# Patient Record
Sex: MALE | Race: WHITE | NOT HISPANIC OR LATINO | Employment: OTHER | URBAN - NONMETROPOLITAN AREA
[De-identification: names, ages, dates, MRNs, and addresses within clinical notes are randomized per-mention and may not be internally consistent; named-entity substitution may affect disease eponyms.]

---

## 2017-02-09 ENCOUNTER — OFFICE VISIT (OUTPATIENT)
Dept: MEDICAL GROUP | Facility: PHYSICIAN GROUP | Age: 82
End: 2017-02-09
Payer: MEDICARE

## 2017-02-09 VITALS
OXYGEN SATURATION: 96 % | BODY MASS INDEX: 20.33 KG/M2 | HEIGHT: 70 IN | RESPIRATION RATE: 16 BRPM | SYSTOLIC BLOOD PRESSURE: 138 MMHG | TEMPERATURE: 98.3 F | HEART RATE: 80 BPM | WEIGHT: 142 LBS | DIASTOLIC BLOOD PRESSURE: 78 MMHG

## 2017-02-09 DIAGNOSIS — M79.605 PAIN OF LEFT LOWER EXTREMITY: ICD-10-CM

## 2017-02-09 DIAGNOSIS — E78.5 DYSLIPIDEMIA: ICD-10-CM

## 2017-02-09 DIAGNOSIS — I73.9 CLAUDICATION (HCC): ICD-10-CM

## 2017-02-09 DIAGNOSIS — Z72.0 TOBACCO ABUSE: ICD-10-CM

## 2017-02-09 DIAGNOSIS — I10 ESSENTIAL HYPERTENSION: ICD-10-CM

## 2017-02-09 PROCEDURE — 4004F PT TOBACCO SCREEN RCVD TLK: CPT | Performed by: INTERNAL MEDICINE

## 2017-02-09 PROCEDURE — G8484 FLU IMMUNIZE NO ADMIN: HCPCS | Performed by: INTERNAL MEDICINE

## 2017-02-09 PROCEDURE — 4040F PNEUMOC VAC/ADMIN/RCVD: CPT | Mod: 8P | Performed by: INTERNAL MEDICINE

## 2017-02-09 PROCEDURE — 1101F PT FALLS ASSESS-DOCD LE1/YR: CPT | Performed by: INTERNAL MEDICINE

## 2017-02-09 PROCEDURE — G8419 CALC BMI OUT NRM PARAM NOF/U: HCPCS | Performed by: INTERNAL MEDICINE

## 2017-02-09 PROCEDURE — 99214 OFFICE O/P EST MOD 30 MIN: CPT | Performed by: INTERNAL MEDICINE

## 2017-02-09 PROCEDURE — G8432 DEP SCR NOT DOC, RNG: HCPCS | Performed by: INTERNAL MEDICINE

## 2017-02-09 RX ORDER — CILOSTAZOL 100 MG/1
100 TABLET ORAL 2 TIMES DAILY
Qty: 60 TAB | Refills: 3 | Status: SHIPPED | OUTPATIENT
Start: 2017-02-09 | End: 2017-06-09 | Stop reason: SDUPTHER

## 2017-02-09 NOTE — MR AVS SNAPSHOT
"        Elias Alberts   2017 3:00 PM   Office Visit   MRN: [id number]    Department:  [facility]   Dept Phone:  [phone]    Description:  Male : 1933   Provider:  Nikki Lopez M.D.           Reason for Visit     Leg Pain R Lower Leg pain x 1 month      Allergies as of 2017     No Known Allergies      You were diagnosed with     Pain of left lower extremity   [2551395]       Essential hypertension   [9833761]       Dyslipidemia   [310411]       Tobacco abuse   [991568]       Claudication (CMS-HCC)   [816781]       Elevated blood pressure   [845148]         Vital Signs     Blood Pressure Pulse Temperature Respirations Height Weight    138/78 mmHg 80 36.8 °C (98.3 °F) 16 1.778 m (5' 10\") 64.411 kg (142 lb)    Body Mass Index Oxygen Saturation Smoking Status             20.37 kg/m2 96% Current Every Day Smoker         Basic Information     Date Of Birth Sex Race Ethnicity Preferred Language    1933 Male White Non- English      Your appointments     Mar 09, 2017  1:20 PM   Established Patient with Nikki Lopez M.D.   [address] [address] [phone]           You will be receiving a confirmation call a few days before your appointment from our automated call confirmation system.              Problem List              ICD-10-CM Priority Class Noted - Resolved    Tobacco abuse Z72.0   Unknown - Present    HTN (hypertension) I10   Unknown - Present    Gastritis K29.70   2014 - Present    Advance care planning Z71.89   3/23/2016 - Present    Claudication (CMS-HCC) I73.9   2017 - Present    Elevated blood pressure I10   2017 - Present      Health Maintenance        Date Due Completion Dates    IMM DTaP/Tdap/Td Vaccine (1 - Tdap) 1952 ---    IMM ZOSTER VACCINE 1993 ---    IMM PNEUMOCOCCAL 65+ (ADULT) LOW/MEDIUM RISK SERIES (1 of 2 - PCV13) 1998 ---    IMM INFLUENZA (1) 2016 --- "            Current Immunizations     No immunizations on file.      Below and/or attached are the medications your provider expects you to take. Review all of your home medications and newly ordered medications with your provider and/or pharmacist. Follow medication instructions as directed by your provider and/or pharmacist. Please keep your medication list with you and share with your provider. Update the information when medications are discontinued, doses are changed, or new medications (including over-the-counter products) are added; and carry medication information at all times in the event of emergency situations     Allergies:  No Known Allergies          Medications  Valid as of: February 09, 2017 -  3:45 PM    Generic Name Brand Name Tablet Size Instructions for use    Cilostazol (Tab) PLETAL 100 MG Take 1 Tab by mouth 2 times a day.        Felodipine (TABLET SR 24 HR) PLENDIL 10 MG Take 1 Tab by mouth every day.        Lisinopril (Tab) PRINIVIL 20 MG Take 1 Tab by mouth every day.        Omeprazole (CAPSULE DELAYED RELEASE) PRILOSEC 20 MG Take 1 Cap by mouth every day.        .                 Medicines prescribed today were sent to:     [facility] [address] [address]    Phone: [phone] Fax: [phone]    Open 24 Hours?: No      Medication refill instructions:       If your prescription bottle indicates you have medication refills left, it is not necessary to call your provider’s office. Please contact your pharmacy and they will refill your medication.    If your prescription bottle indicates you do not have any refills left, you may request refills at any time through one of the following ways: The online [employer] system (except Urgent Care), by calling your provider’s office, or by asking your pharmacy to contact your provider’s office with a refill request. Medication refills are processed only during regular business hours  and may not be available until the next business day. Your provider may request additional information or to have a follow-up visit with you prior to refilling your medication.   *Please Note: Medication refills are assigned a new Rx number when refilled electronically. Your pharmacy may indicate that no refills were authorized even though a new prescription for the same medication is available at the pharmacy. Please request the medicine by name with the pharmacy before contacting your provider for a refill.        Your To Do List     Future Labs/Procedures Complete By Expires    COMP METABOLIC PANEL  As directed 2/9/2018    LIPID PROFILE  As directed 2/9/2018    US-EXTREMITY LOWER ARTERY UNILATERAL RIGHT  As directed 2/9/2018    US-EXTREMITY VENOUS UNILATERAL-LOWER RIGHT  As directed 2/9/2018      Instructions    1. Have fasting labs checked.    2. Have ultrasound of your leg.    3. Start pletal 100mg twice per day; watch for bruising/easy bleeding.    4. Follow up in 4 weeks.          [employer] Access Code: [email or web address]  Expires: 3/11/2017  3:45 PM    Your email address is not on file at [facility].  Email Addresses are required for you to sign up for [employer], please contact [phone] to verify your personal information and to provide your email address prior to attempting to register for [employer].    Elias Alberts  [address] [place] [address]  [place] [id number]    [employer]  A secure, online tool to manage your health information     [facility]’s [employer]® is a secure, online tool that connects you to your personalized health information from the privacy of your home -- day or night - making it very easy for you to manage your healthcare. Once the activation process is completed, you can even access your medical information using the [employer] jose, which is available for free in the Apple Jose store or Google Play store.     To learn more about [employer], visit [email or web address]    There are two levels of access  available (as shown below):   My Chart Features  [facility]  Specialists [facility]  Urgent  Care Non-[facility] Doctor   Email your healthcare team securely and privately 24/7 X X X    Manage appointments: schedule your next appointment; view details of past/upcoming appointments X      Request prescription refills. X      View recent personal medical records, including lab and immunizations X X X X   View health record, including health history, allergies, medications X X X X   Read reports about your outpatient visits, procedures, consult and ER notes X X X X   See your discharge summary, which is a recap of your hospital and/or ER visit that includes your diagnosis, lab results, and care plan X X  X     How to register for [employer]:  Once your e-mail address has been verified, follow the following steps to sign up for [employer].     1. Go to  [email or web address]  2. Click on the Sign Up Now box, which takes you to the New Member Sign Up page. You will need to provide the following information:  a. Enter your [employer] Access Code exactly as it appears at the top of this page. (You will not need to use this code after you’ve completed the sign-up process. If you do not sign up before the expiration date, you must request a new code.)   b. Enter your date of birth.   c. Enter your home email address.   d. Click Submit, and follow the next screen’s instructions.  3. Create a [employer] ID. This will be your [employer] login ID and cannot be changed, so think of one that is secure and easy to remember.  4. Create a [employer] password. You can change your password at any time.  5. Enter your Password Reset Question and Answer. This can be used at a later time if you forget your password.   6. Enter your e-mail address. This allows you to receive e-mail notifications when new information is available in [employer].  7. Click Sign Up. You can now view your health information.    For assistance  activating your [employer] account, call [phone]

## 2017-02-09 NOTE — PATIENT INSTRUCTIONS
1. Have fasting labs checked.    2. Have ultrasound of your leg.    3. Start pletal 100mg twice per day; watch for bruising/easy bleeding.    4. Follow up in 4 weeks.

## 2017-02-10 NOTE — ASSESSMENT & PLAN NOTE
Patient is an 83-year-old male who comes in today for hospital follow-up. He was recently at [facility] and presented with right leg pain, feeling unwell and dizziness. Per the patient and his wife, he had a workup actually for elevated blood pressure and rapid heart rate. Patient does continue on the cinna prole and felodipine. His medications were changed and his wife notes that since his ER discharge, his blood pressure has been okay. Patient tells me that he's had right-sided leg pain in the calf for about 20 years. Recently symptoms have been worsening. He can walk to the mailbox but any distance past that causes him pain. As soon as he rests or sits down the pain goes away. He's had no swelling, no redness in the leg. He is an active tobacco smoker. He did not have an ultrasound while he was in the ER. I discussed with him today that I think he has peripheral arterial disease causing claudication. Patient needs to have an arterial ultrasound of his leg. I have ordered this today. I also counseled him on the importance of smoking cessation. Patient does currently take full dose aspirin. We discussed adding Pletal. He needs to have cholesterol checked. I did discuss him that if he does have severe peripheral arterial disease, I will refer him to vascular surgery.

## 2017-02-10 NOTE — PROGRESS NOTES
Chief Complaint   Patient presents with   • Leg Pain     R Lower Leg pain x 1 month       HISTORY OF PRESENT ILLNESS: Patient is a 83 y.o. male established patient who presents today to be seen for acute issue    Claudication (CMS-HCC)  Patient is an 83-year-old male who comes in today for hospital follow-up. He was recently at [facility] and presented with right leg pain, feeling unwell and dizziness. Per the patient and his wife, he had a workup actually for elevated blood pressure and rapid heart rate. Patient does continue on the cinna prole and felodipine. His medications were changed and his wife notes that since his ER discharge, his blood pressure has been okay. Patient tells me that he's had right-sided leg pain in the calf for about 20 years. Recently symptoms have been worsening. He can walk to the mailbox but any distance past that causes him pain. As soon as he rests or sits down the pain goes away. He's had no swelling, no redness in the leg. He is an active tobacco smoker. He did not have an ultrasound while he was in the ER. I discussed with him today that I think he has peripheral arterial disease causing claudication. Patient needs to have an arterial ultrasound of his leg. I have ordered this today. I also counseled him on the importance of smoking cessation. Patient does currently take full dose aspirin. We discussed adding Pletal. He needs to have cholesterol checked. I did discuss him that if he does have severe peripheral arterial disease, I will refer him to vascular surgery.      Patient Active Problem List    Diagnosis Date Noted   • Claudication (CMS-HCC) 02/09/2017   • Advance care planning 03/23/2016   • Gastritis 01/14/2014   • Tobacco abuse    • HTN (hypertension)        Allergies:Review of patient's allergies indicates no known allergies.    Current Outpatient Prescriptions Ordered in [employer] [facility]   Medication Sig Dispense Refill   • cilostazol (PLETAL) 100 MG Tab Take 1 Tab by  "mouth 2 times a day. 60 Tab 3   • felodipine (PLENDIL) 10 MG TABLET SR 24 HR Take 1 Tab by mouth every day. 90 Tab 3   • lisinopril (PRINIVIL) 20 MG Tab Take 1 Tab by mouth every day. 90 Tab 3   • omeprazole (PRILOSEC) 20 MG delayed-release capsule Take 1 Cap by mouth every day. 90 Cap 3     No current Epic-ordered facility-administered medications on file.       Past Medical History   Diagnosis Date   • Hypertension    • Tobacco abuse    • HTN (hypertension)    • GERD (gastroesophageal reflux disease)        Social History   Substance Use Topics   • Smoking status: Current Every Day Smoker -- 1.00 packs/day     Types: Cigarettes   • Smokeless tobacco: Never Used   • Alcohol Use: 0.0 oz/week     0 Standard drinks or equivalent per week       No family status information on file.   History reviewed. No pertinent family history.    ROS:  Review of Systems   Constitutional: Negative for fever and malaise/fatigue.   HENT: Negative for congestion  Respiratory: Negative for cough  Cardiovascular: Negative for chest pain  Gastrointestinal: Negative for nausea, vomiting and abdominal pain.  Musculoskeletal: Positive right calf pain  All other systems reviewed and are negative except as in HPI.      Exam:  Blood pressure 138/78, pulse 80, temperature 36.8 °C (98.3 °F), resp. rate 16, height 1.778 m (5' 10\"), weight 64.411 kg (142 lb), SpO2 96 %.  General:  Well nourished, well developed elderly male in NAD  Head is grossly normal.  Neck: Supple without JVD   Pulmonary: Clear to ausculation and percussion.  Normal effort. No rales, ronchi, or wheezing.  Cardiovascular: Regular rate and rhythm without murmur. Carotid and radial pulses are intact and equal bilaterally.  Extremities: mildly decreased pulses in right foot, no swelling or erythema of right leg, similar in size to left leg.        Assessment/Plan:  1. Pain of left lower extremity  US-EXTREMITY LOWER ARTERY UNILATERAL RIGHT    US-EXTREMITY VENOUS UNILATERAL-LOWER " RIGHT    Uncontrolled, seems consistent with claudication. Will check ultrasound   2. Essential hypertension      Controlled, currently on medication   3. Dyslipidemia  COMP METABOLIC PANEL    LIPID PROFILE    Uncontrolled, will check labs   4. Tobacco abuse      Uncontrolled, continuing smoke   5. Claudication (CMS-[facility])  cilostazol (PLETAL) 100 MG Tab    Uncontrolled, will try Pletal     Please note that this dictation was created using voice recognition software. I have made every reasonable attempt to correct obvious errors, but I expect that there are errors of grammar and possibly content that I did not discover before finalizing the note.

## 2017-02-22 ENCOUNTER — TELEPHONE (OUTPATIENT)
Dept: URGENT CARE | Facility: PHYSICIAN GROUP | Age: 82
End: 2017-02-22

## 2017-02-22 DIAGNOSIS — I10 ESSENTIAL HYPERTENSION: ICD-10-CM

## 2017-02-22 NOTE — TELEPHONE ENCOUNTER
Pt's wife came in to follow up on results from an ultra sound done on [id number] at [facility], Pt states they were supposed to fax it over. However the Pt has not heard back, Pt states she is having trouble accessing [employer], Pt prefers a call at [phone]

## 2017-02-23 NOTE — TELEPHONE ENCOUNTER
Please let the patient know he has mild to moderate cholesterol plaque in his legs. This will only worsen if he does not stop smoking. Has he been better on the pletal?

## 2017-02-24 RX ORDER — FELODIPINE 10 MG/1
10 TABLET, EXTENDED RELEASE ORAL
Qty: 90 TAB | Refills: 3 | Status: SHIPPED | OUTPATIENT
Start: 2017-02-24 | End: 2018-03-20 | Stop reason: SDUPTHER

## 2017-02-24 NOTE — TELEPHONE ENCOUNTER
Ofe, wife, states that he is doing better on the pletal.  She is wondering if this is causing his leg pain and if his leg is completely blocked.  She said that she would inform him to stop smoking.  He also needs a refill on his BP med.  See below.

## 2017-02-24 NOTE — TELEPHONE ENCOUNTER
If he feels better on the pletal, then yes, cholesterol plaque is a main cause of his symptoms but his arteries are not completely blocked on the ultrasound.  In order to keep this from getting worse, he needs to:    1. Stop smoking  2. Have low cholesterol  3. Take a baby aspirin 81mg each day.    Please have him have his fasting labs checked prior to our 3/9/17 appt. We will discuss his cholesterol and a medication for it at that time.

## 2017-02-27 ENCOUNTER — TELEPHONE (OUTPATIENT)
Dept: MEDICAL GROUP | Facility: PHYSICIAN GROUP | Age: 82
End: 2017-02-27

## 2017-02-27 ENCOUNTER — HOSPITAL ENCOUNTER (OUTPATIENT)
Dept: LAB | Facility: MEDICAL CENTER | Age: 82
End: 2017-02-27
Attending: INTERNAL MEDICINE
Payer: MEDICARE

## 2017-02-27 DIAGNOSIS — E78.5 DYSLIPIDEMIA: ICD-10-CM

## 2017-02-27 LAB
ALBUMIN SERPL BCP-MCNC: 4.1 G/DL (ref 3.2–4.9)
ALBUMIN/GLOB SERPL: 1.2 G/DL
ALP SERPL-CCNC: 85 U/L (ref 30–99)
ALT SERPL-CCNC: 20 U/L (ref 2–50)
ANION GAP SERPL CALC-SCNC: 9 MMOL/L (ref 0–11.9)
AST SERPL-CCNC: 22 U/L (ref 12–45)
BILIRUB SERPL-MCNC: 0.6 MG/DL (ref 0.1–1.5)
BUN SERPL-MCNC: 23 MG/DL (ref 8–22)
CALCIUM SERPL-MCNC: 9.6 MG/DL (ref 8.5–10.5)
CHLORIDE SERPL-SCNC: 103 MMOL/L (ref 96–112)
CHOLEST SERPL-MCNC: 172 MG/DL (ref 100–199)
CO2 SERPL-SCNC: 26 MMOL/L (ref 20–33)
CREAT SERPL-MCNC: 1.43 MG/DL (ref 0.5–1.4)
GLOBULIN SER CALC-MCNC: 3.3 G/DL (ref 1.9–3.5)
GLUCOSE SERPL-MCNC: 137 MG/DL (ref 65–99)
HDLC SERPL-MCNC: 51 MG/DL
LDLC SERPL CALC-MCNC: 107 MG/DL
POTASSIUM SERPL-SCNC: 4.2 MMOL/L (ref 3.6–5.5)
PROT SERPL-MCNC: 7.4 G/DL (ref 6–8.2)
SODIUM SERPL-SCNC: 138 MMOL/L (ref 135–145)
TRIGL SERPL-MCNC: 72 MG/DL (ref 0–149)

## 2017-02-27 PROCEDURE — 36415 COLL VENOUS BLD VENIPUNCTURE: CPT

## 2017-02-27 PROCEDURE — 80061 LIPID PANEL: CPT

## 2017-02-27 PROCEDURE — 80053 COMPREHEN METABOLIC PANEL: CPT

## 2017-02-27 NOTE — TELEPHONE ENCOUNTER
1. Caller Name:  Ofe wife                                  Call Back Number: [phone] (home)       Patient approves a detailed voicemail message: N\A    Ofe wife left voice message last Thursday 2/23/17 asking for clarification on message they received from Mary regarding no eating prior to appointment.  Returned call, spoke with ED, he had labs drawn today and he was fasting. Has f/v with Dr Lopez next week.

## 2017-03-02 NOTE — TELEPHONE ENCOUNTER
1. Caller Name: spouse Ofe                                      Call Back Number: [phone] (home)       Patient approves a detailed voicemail message: victor m Thakur stating they received a call from Mary regarding Ed needing to be fasting the night prior and the day of Ed's appointment.   Informed Ofe, Ed needed to be fasting for labs but not for appointment with Dr gomes 3/9/17.

## 2017-03-09 ENCOUNTER — OFFICE VISIT (OUTPATIENT)
Dept: MEDICAL GROUP | Facility: PHYSICIAN GROUP | Age: 82
End: 2017-03-09
Payer: MEDICARE

## 2017-03-09 VITALS
OXYGEN SATURATION: 97 % | HEIGHT: 70 IN | RESPIRATION RATE: 16 BRPM | WEIGHT: 141 LBS | SYSTOLIC BLOOD PRESSURE: 114 MMHG | TEMPERATURE: 97.3 F | BODY MASS INDEX: 20.19 KG/M2 | DIASTOLIC BLOOD PRESSURE: 62 MMHG | HEART RATE: 96 BPM

## 2017-03-09 DIAGNOSIS — R73.01 ELEVATED FASTING GLUCOSE: ICD-10-CM

## 2017-03-09 DIAGNOSIS — Z72.0 TOBACCO ABUSE: ICD-10-CM

## 2017-03-09 DIAGNOSIS — I73.9 PERIPHERAL ARTERIAL DISEASE (HCC): ICD-10-CM

## 2017-03-09 DIAGNOSIS — N28.9 RENAL INSUFFICIENCY: ICD-10-CM

## 2017-03-09 DIAGNOSIS — I10 ESSENTIAL HYPERTENSION: ICD-10-CM

## 2017-03-09 DIAGNOSIS — E78.01 FAMILIAL HYPERCHOLESTEROLEMIA: ICD-10-CM

## 2017-03-09 DIAGNOSIS — I73.9 CLAUDICATION (HCC): ICD-10-CM

## 2017-03-09 PROCEDURE — 4004F PT TOBACCO SCREEN RCVD TLK: CPT | Performed by: INTERNAL MEDICINE

## 2017-03-09 PROCEDURE — 4040F PNEUMOC VAC/ADMIN/RCVD: CPT | Mod: 8P | Performed by: INTERNAL MEDICINE

## 2017-03-09 PROCEDURE — G8419 CALC BMI OUT NRM PARAM NOF/U: HCPCS | Performed by: INTERNAL MEDICINE

## 2017-03-09 PROCEDURE — 99214 OFFICE O/P EST MOD 30 MIN: CPT | Performed by: INTERNAL MEDICINE

## 2017-03-09 PROCEDURE — 1101F PT FALLS ASSESS-DOCD LE1/YR: CPT | Performed by: INTERNAL MEDICINE

## 2017-03-09 PROCEDURE — G8432 DEP SCR NOT DOC, RNG: HCPCS | Performed by: INTERNAL MEDICINE

## 2017-03-09 PROCEDURE — G8484 FLU IMMUNIZE NO ADMIN: HCPCS | Performed by: INTERNAL MEDICINE

## 2017-03-09 RX ORDER — ATORVASTATIN CALCIUM 20 MG/1
20 TABLET, FILM COATED ORAL DAILY
Qty: 30 TAB | Refills: 11 | Status: SHIPPED | OUTPATIENT
Start: 2017-03-09 | End: 2017-05-09 | Stop reason: SDUPTHER

## 2017-03-09 ASSESSMENT — PATIENT HEALTH QUESTIONNAIRE - PHQ9: CLINICAL INTERPRETATION OF PHQ2 SCORE: 0

## 2017-03-09 NOTE — ASSESSMENT & PLAN NOTE
Patient is an 83-year-old male who came in one month ago with complaints of right leg pain with walking that seem consistent as claudication. He is a smoker. He had an ultrasound which showed moderate peripheral arterial disease in his right leg. Patient was started on Pletal and he notes a marked improvement in his symptoms. He also takes aspirin 325 mg a day. Patient and I discussed ways he can impact his peripheral arterial disease. I strongly recommended he quit smoking. We also discussed starting him on a statin and he will begin atorvastatin 20 mg a day.

## 2017-03-09 NOTE — PROGRESS NOTES
Chief Complaint   Patient presents with   • Results     fv labs, claudication       HISTORY OF PRESENT ILLNESS: Patient is a 83 y.o. male established patient who presents today to be seen for acute and chronic issues.    Peripheral arterial disease (CMS-HCC)  Patient is an 83-year-old male who came in one month ago with complaints of right leg pain with walking that seem consistent as claudication. He is a smoker. He had an ultrasound which showed moderate peripheral arterial disease in his right leg. Patient was started on Pletal and he notes a marked improvement in his symptoms. He also takes aspirin 325 mg a day. Patient and I discussed ways he can impact his peripheral arterial disease. I strongly recommended he quit smoking. We also discussed starting him on a statin and he will begin atorvastatin 20 mg a day.    Familial hypercholesterolemia  Patient had recent lab work which showed elevated cholesterol 107. This is above his goal of 70 for having peripheral arterial disease. He will start on atorvastatin.    Elevated fasting glucose  Patient's sugar was recently 137 on fasting labs. His brother had diabetes. We discussed that if he has one more glucose is high, he also qualifies for the disorder. He will have labs checked in follow-up in 2 months.    Claudication (CMS-HCC)  This is a chronic condition which is well controlled on medications. Patient is tolerating medications without side effects.    HTN (hypertension)  This is a chronic condition which is well controlled on medications. Patient is tolerating medications without side effects.    Renal insufficiency  Recent kidney function was decreased on labs. Patient has increasing hydration.      Patient Active Problem List    Diagnosis Date Noted   • Elevated fasting glucose 03/09/2017   • Renal insufficiency 03/09/2017   • Familial hypercholesterolemia 03/09/2017   • Peripheral arterial disease (CMS-HCC) 03/09/2017   • Claudication (CMS-HCC) 02/09/2017  "  • Advance care planning 03/23/2016   • Gastritis 01/14/2014   • Tobacco abuse    • HTN (hypertension)        Allergies:Review of patient's allergies indicates no known allergies.    Current Outpatient Prescriptions Ordered in [facility]   Medication Sig Dispense Refill   • atorvastatin (LIPITOR) 20 MG Tab Take 1 Tab by mouth every day. 30 Tab 11   • felodipine (PLENDIL) 10 MG TABLET SR 24 HR Take 1 Tab by mouth every day. 90 Tab 3   • cilostazol (PLETAL) 100 MG Tab Take 1 Tab by mouth 2 times a day. 60 Tab 3   • lisinopril (PRINIVIL) 20 MG Tab Take 1 Tab by mouth every day. 90 Tab 3   • omeprazole (PRILOSEC) 20 MG delayed-release capsule Take 1 Cap by mouth every day. 90 Cap 3     No current Epic-ordered facility-administered medications on file.       Past Medical History   Diagnosis Date   • Hypertension    • Tobacco abuse    • HTN (hypertension)    • GERD (gastroesophageal reflux disease)        Social History   Substance Use Topics   • Smoking status: Current Every Day Smoker -- 1.00 packs/day     Types: Cigarettes   • Smokeless tobacco: Never Used   • Alcohol Use: 0.0 oz/week     0 Standard drinks or equivalent per week       No family status information on file.   History reviewed. No pertinent family history.    ROS:  Review of Systems   Constitutional: Negative for fever and malaise/fatigue.   HENT: Negative for congestion  Respiratory: Negative for cough  Cardiovascular: Negative for chest pain  Gastrointestinal: Negative for nausea, vomiting and abdominal pain.  All other systems reviewed and are negative except as in HPI.      Exam:  Blood pressure 114/62, pulse 96, temperature 36.3 °C (97.3 °F), resp. rate 16, height 1.778 m (5' 10\"), weight 63.957 kg (141 lb), SpO2 97 %.  General:  Well nourished, well developed elderly male in NAD  Head is grossly normal.  Neck: Supple without JVD   Pulmonary: Clear to ausculation and percussion.  Normal effort. No rales, ronchi, or wheezing.  Cardiovascular: Regular rate " and rhythm without murmur. Carotid and radial pulses are intact and equal bilaterally.  Extremities: no clubbing, cyanosis, or edema.    Hospital Outpatient Visit on 02/27/2017   Component Date Value Ref Range Status   • Sodium 02/27/2017 138  135 - 145 mmol/L Final   • Potassium 02/27/2017 4.2  3.6 - 5.5 mmol/L Final   • Chloride 02/27/2017 103  96 - 112 mmol/L Final   • Co2 02/27/2017 26  20 - 33 mmol/L Final   • Anion Gap 02/27/2017 9.0  0.0 - 11.9 Final   • Glucose 02/27/2017 137* 65 - 99 mg/dL Final   • Bun 02/27/2017 23* 8 - 22 mg/dL Final   • Creatinine 02/27/2017 1.43* 0.50 - 1.40 mg/dL Final   • Calcium 02/27/2017 9.6  8.5 - 10.5 mg/dL Final   • AST(SGOT) 02/27/2017 22  12 - 45 U/L Final   • ALT(SGPT) 02/27/2017 20  2 - 50 U/L Final   • Alkaline Phosphatase 02/27/2017 85  30 - 99 U/L Final   • Total Bilirubin 02/27/2017 0.6  0.1 - 1.5 mg/dL Final   • Albumin 02/27/2017 4.1  3.2 - 4.9 g/dL Final   • Total Protein 02/27/2017 7.4  6.0 - 8.2 g/dL Final   • Globulin 02/27/2017 3.3  1.9 - 3.5 g/dL Final   • A-G Ratio 02/27/2017 1.2   Final   • Cholesterol,Tot 02/27/2017 172  100 - 199 mg/dL Final   • Triglycerides 02/27/2017 72  0 - 149 mg/dL Final   • HDL 02/27/2017 51  >=40 mg/dL Final   • LDL 02/27/2017 107* <100 mg/dL Final   • GFR If  02/27/2017 57* >60 mL/min/1.73 m 2 Final   • GFR If Non  02/27/2017 47* >60 mL/min/1.73 m 2 Final         Assessment/Plan:  1. Essential hypertension      Controlled, on medication   2. Familial hypercholesterolemia  LIPID PROFILE    COMP METABOLIC PANEL    atorvastatin (LIPITOR) 20 MG Tab    Uncontrolled, starting statin   3. Claudication (CMS-HCC)      Controlled, improved on Pletal   4. Elevated fasting glucose  HEMOGLOBIN A1C    Uncontrolled, will check hemoglobin A1c   5. Renal insufficiency      Uncontrolled, increasing fluid intake   6. Peripheral arterial disease (CMS-HCC)      Uncontrolled, starting statin   7. Tobacco abuse       Uncontrolled, recommended to quit smoking     Please note that this dictation was created using voice recognition software. I have made every reasonable attempt to correct obvious errors, but I expect that there are errors of grammar and possibly content that I did not discover before finalizing the note.

## 2017-03-09 NOTE — ASSESSMENT & PLAN NOTE
Patient's sugar was recently 137 on fasting labs. His brother had diabetes. We discussed that if he has one more glucose is high, he also qualifies for the disorder. He will have labs checked in follow-up in 2 months.

## 2017-03-09 NOTE — PATIENT INSTRUCTIONS
1. Start atorvastatin 20mg a day.    2. Have fasting labs 4-6 weeks later.    3. Follow up in 2 months.

## 2017-03-09 NOTE — ASSESSMENT & PLAN NOTE
Patient had recent lab work which showed elevated cholesterol 107. This is above his goal of 70 for having peripheral arterial disease. He will start on atorvastatin.

## 2017-03-09 NOTE — MR AVS SNAPSHOT
"        Elias Alberts   3/9/2017 1:20 PM   Office Visit   MRN: [id number]    Department:  [facility]   Dept Phone:  [phone]    Description:  Male : 1933   Provider:  Nikki Lopez M.D.           Reason for Visit     Results fv labs, claudication      Allergies as of 3/9/2017     No Known Allergies      You were diagnosed with     Essential hypertension   [5887792]       Familial hypercholesterolemia   [888530]       Claudication (CMS-HCC)   [426307]       Elevated fasting glucose   [041765]       Renal insufficiency   [025296]       Peripheral arterial disease (CMS-HCC)   [696466]       Tobacco abuse   [503852]         Vital Signs     Blood Pressure Pulse Temperature Respirations Height Weight    114/62 mmHg 96 36.3 °C (97.3 °F) 16 1.778 m (5' 10\") 63.957 kg (141 lb)    Body Mass Index Oxygen Saturation Smoking Status             20.23 kg/m2 97% Current Every Day Smoker         Basic Information     Date Of Birth Sex Race Ethnicity Preferred Language    1933 Male White Non- English      Problem List              ICD-10-CM Priority Class Noted - Resolved    Tobacco abuse Z72.0   Unknown - Present    HTN (hypertension) I10   Unknown - Present    Gastritis K29.70   2014 - Present    Advance care planning Z71.89   3/23/2016 - Present    Claudication (CMS-HCC) I73.9   2017 - Present    Elevated fasting glucose R73.01   3/9/2017 - Present    Renal insufficiency N28.9   3/9/2017 - Present    Familial hypercholesterolemia E78.01   3/9/2017 - Present    Peripheral arterial disease (CMS-HCC) I73.9   3/9/2017 - Present      Health Maintenance        Date Due Completion Dates    IMM DTaP/Tdap/Td Vaccine (1 - Tdap) 1952 ---    IMM ZOSTER VACCINE 1993 ---    IMM PNEUMOCOCCAL 65+ (ADULT) LOW/MEDIUM RISK SERIES (1 of 2 - PCV13) 1998 ---    IMM INFLUENZA (1) 2016 ---            Current Immunizations     No immunizations on file.      Below and/or attached are the " medications your provider expects you to take. Review all of your home medications and newly ordered medications with your provider and/or pharmacist. Follow medication instructions as directed by your provider and/or pharmacist. Please keep your medication list with you and share with your provider. Update the information when medications are discontinued, doses are changed, or new medications (including over-the-counter products) are added; and carry medication information at all times in the event of emergency situations     Allergies:  No Known Allergies          Medications  Valid as of: March 09, 2017 -  1:43 PM    Generic Name Brand Name Tablet Size Instructions for use    Atorvastatin Calcium (Tab) LIPITOR 20 MG Take 1 Tab by mouth every day.        Cilostazol (Tab) PLETAL 100 MG Take 1 Tab by mouth 2 times a day.        Felodipine (TABLET SR 24 HR) PLENDIL 10 MG Take 1 Tab by mouth every day.        Lisinopril (Tab) PRINIVIL 20 MG Take 1 Tab by mouth every day.        Omeprazole (CAPSULE DELAYED RELEASE) PRILOSEC 20 MG Take 1 Cap by mouth every day.        .                 Medicines prescribed today were sent to:     [facility] [address] [address]    Phone: [phone] Fax: [phone]    Open 24 Hours?: No      Medication refill instructions:       If your prescription bottle indicates you have medication refills left, it is not necessary to call your provider’s office. Please contact your pharmacy and they will refill your medication.    If your prescription bottle indicates you do not have any refills left, you may request refills at any time through one of the following ways: The online SeatGeek system (except Urgent Care), by calling your provider’s office, or by asking your pharmacy to contact your provider’s office with a refill request. Medication refills are processed only during regular business hours and may not be available  until the next business day. Your provider may request additional information or to have a follow-up visit with you prior to refilling your medication.   *Please Note: Medication refills are assigned a new Rx number when refilled electronically. Your pharmacy may indicate that no refills were authorized even though a new prescription for the same medication is available at the pharmacy. Please request the medicine by name with the pharmacy before contacting your provider for a refill.        Your To Do List     Future Labs/Procedures Complete By Expires    COMP METABOLIC PANEL  As directed 3/9/2018    HEMOGLOBIN A1C  As directed 3/9/2018    LIPID PROFILE  As directed 3/9/2018      Instructions    1. Start atorvastatin 20mg a day.    2. Have fasting labs 4-6 weeks later.    3. Follow up in 2 months.          Neuro Hero Access Code: [email or web address]  Expires: 3/11/2017  3:45 PM    Neuro Hero  A secure, online tool to manage your health information     [employer]’s Neuro Hero® is a secure, online tool that connects you to your personalized health information from the privacy of your home -- day or night - making it very easy for you to manage your healthcare. Once the activation process is completed, you can even access your medical information using the Neuro Hero jose, which is available for free in the Apple Jose store or Google Play store.     Neuro Hero provides the following levels of access (as shown below):   My Chart Features   [facility] Primary [facility] Doctor [facility]  Specialists [facility]  Urgent  Care Non-[facility]  Primary Care  Doctor   Email your healthcare team securely and privately 24/7 X X X    Manage appointments: schedule your next appointment; view details of past/upcoming appointments X      Request prescription refills. X      View recent personal medical records, including lab and immunizations X X X X   View health record, including health history, allergies, medications X X X X   Read reports about your outpatient  visits, procedures, consult and ER notes X X X X   See your discharge summary, which is a recap of your hospital and/or ER visit that includes your diagnosis, lab results, and care plan. X X       How to register for SIMTEK:  1. Go to  [email or web address].  2. Click on the Sign Up Now box, which takes you to the New Member Sign Up page. You will need to provide the following information:  a. Enter your SIMTEK Access Code exactly as it appears at the top of this page. (You will not need to use this code after you’ve completed the sign-up process. If you do not sign up before the expiration date, you must request a new code.)   b. Enter your date of birth.   c. Enter your home email address.   d. Click Submit, and follow the next screen’s instructions.  3. Create a SIMTEK ID. This will be your SIMTEK login ID and cannot be changed, so think of one that is secure and easy to remember.  4. Create a SIMTEK password. You can change your password at any time.  5. Enter your Password Reset Question and Answer. This can be used at a later time if you forget your password.   6. Enter your e-mail address. This allows you to receive e-mail notifications when new information is available in SIMTEK.  7. Click Sign Up. You can now view your health information.    For assistance activating your SIMTEK account, call [phone]        Quit Tobacco Information     Do you want to quit using tobacco?    Quitting tobacco decreases risks of cancer, heart and lung disease, increases life expectancy, improves sense of taste and smell, and increases spending money, among other benefits.    If you are thinking about quitting, we can help.  • [employer] Quit Tobacco Program: 979.211.7721  o Program occurs weekly for four weeks and includes pharmacist consultation on products to support quitting smoking or chewing tobacco. A provider referral is needed for pharmacist consultation.  • Tobacco Users Help Hotline: 0-800-QUIT-NOW  (975-9996) or https://[place].quitlogix.org/  o Free, confidential telephone and online coaching for [place] residents. Sessions are designed on a schedule that is convenient for you. Eligible clients receive free nicotine replacement therapy.  • Nationally: www.smokefree.gov  o Information and professional assistance to support both immediate and long-term needs as you become, and remain, a non-smoker. Smokefree.gov allows you to choose the help that best fits your needs.

## 2017-04-10 RX ORDER — LISINOPRIL 20 MG/1
TABLET ORAL
Qty: 90 TAB | Refills: 1 | Status: SHIPPED | OUTPATIENT
Start: 2017-04-10 | End: 2017-09-27 | Stop reason: SDUPTHER

## 2017-04-10 NOTE — TELEPHONE ENCOUNTER
Was the patient seen in the last year in this department? Yes     Does patient have an active prescription for medications requested? No     Received Request Via: Pharmacy      Pt met protocol?: Yes    LAST OV 03/09/2017    BP Readings from Last 1 Encounters:   03/09/17 114/62

## 2017-04-10 NOTE — TELEPHONE ENCOUNTER
Refill X 6 months, sent to pharmacy.Pt. Seen in the last 6 months per protocol.   Lab Results   Component Value Date/Time    SODIUM 138 02/27/2017 07:26 AM    POTASSIUM 4.2 02/27/2017 07:26 AM    CHLORIDE 103 02/27/2017 07:26 AM    CO2 26 02/27/2017 07:26 AM    GLUCOSE 137* 02/27/2017 07:26 AM    BUN 23* 02/27/2017 07:26 AM    CREATININE 1.43* 02/27/2017 07:26 AM

## 2017-04-28 ENCOUNTER — HOSPITAL ENCOUNTER (OUTPATIENT)
Dept: LAB | Facility: MEDICAL CENTER | Age: 82
End: 2017-04-28
Attending: INTERNAL MEDICINE
Payer: MEDICARE

## 2017-04-28 DIAGNOSIS — E78.01 FAMILIAL HYPERCHOLESTEROLEMIA: ICD-10-CM

## 2017-04-28 DIAGNOSIS — R73.01 ELEVATED FASTING GLUCOSE: ICD-10-CM

## 2017-04-28 LAB
ALBUMIN SERPL BCP-MCNC: 4.3 G/DL (ref 3.2–4.9)
ALBUMIN/GLOB SERPL: 1.5 G/DL
ALP SERPL-CCNC: 66 U/L (ref 30–99)
ALT SERPL-CCNC: 21 U/L (ref 2–50)
ANION GAP SERPL CALC-SCNC: 8 MMOL/L (ref 0–11.9)
AST SERPL-CCNC: 24 U/L (ref 12–45)
BILIRUB SERPL-MCNC: 0.9 MG/DL (ref 0.1–1.5)
BUN SERPL-MCNC: 15 MG/DL (ref 8–22)
CALCIUM SERPL-MCNC: 9.1 MG/DL (ref 8.5–10.5)
CHLORIDE SERPL-SCNC: 103 MMOL/L (ref 96–112)
CHOLEST SERPL-MCNC: 116 MG/DL (ref 100–199)
CO2 SERPL-SCNC: 25 MMOL/L (ref 20–33)
CREAT SERPL-MCNC: 0.95 MG/DL (ref 0.5–1.4)
EST. AVERAGE GLUCOSE BLD GHB EST-MCNC: 120 MG/DL
GFR SERPL CREATININE-BSD FRML MDRD: >60 ML/MIN/1.73 M 2
GLOBULIN SER CALC-MCNC: 2.8 G/DL (ref 1.9–3.5)
GLUCOSE SERPL-MCNC: 129 MG/DL (ref 65–99)
HBA1C MFR BLD: 5.8 % (ref 0–5.6)
HDLC SERPL-MCNC: 55 MG/DL
LDLC SERPL CALC-MCNC: 49 MG/DL
POTASSIUM SERPL-SCNC: 4.2 MMOL/L (ref 3.6–5.5)
PROT SERPL-MCNC: 7.1 G/DL (ref 6–8.2)
SODIUM SERPL-SCNC: 136 MMOL/L (ref 135–145)
TRIGL SERPL-MCNC: 61 MG/DL (ref 0–149)

## 2017-04-28 PROCEDURE — 80061 LIPID PANEL: CPT

## 2017-04-28 PROCEDURE — 83036 HEMOGLOBIN GLYCOSYLATED A1C: CPT

## 2017-04-28 PROCEDURE — 36415 COLL VENOUS BLD VENIPUNCTURE: CPT

## 2017-04-28 PROCEDURE — 80053 COMPREHEN METABOLIC PANEL: CPT

## 2017-05-09 ENCOUNTER — OFFICE VISIT (OUTPATIENT)
Dept: MEDICAL GROUP | Facility: PHYSICIAN GROUP | Age: 82
End: 2017-05-09
Payer: MEDICARE

## 2017-05-09 VITALS
HEIGHT: 70 IN | SYSTOLIC BLOOD PRESSURE: 120 MMHG | RESPIRATION RATE: 16 BRPM | BODY MASS INDEX: 19.76 KG/M2 | DIASTOLIC BLOOD PRESSURE: 68 MMHG | WEIGHT: 138 LBS | TEMPERATURE: 97.7 F | HEART RATE: 100 BPM | OXYGEN SATURATION: 97 %

## 2017-05-09 DIAGNOSIS — K29.70 GASTRITIS WITHOUT BLEEDING, UNSPECIFIED CHRONICITY, UNSPECIFIED GASTRITIS TYPE: ICD-10-CM

## 2017-05-09 DIAGNOSIS — I10 ESSENTIAL HYPERTENSION: ICD-10-CM

## 2017-05-09 DIAGNOSIS — E78.01 FAMILIAL HYPERCHOLESTEROLEMIA: ICD-10-CM

## 2017-05-09 DIAGNOSIS — Z72.0 TOBACCO ABUSE: ICD-10-CM

## 2017-05-09 DIAGNOSIS — R73.01 ELEVATED FASTING GLUCOSE: ICD-10-CM

## 2017-05-09 PROCEDURE — G8432 DEP SCR NOT DOC, RNG: HCPCS | Performed by: NURSE PRACTITIONER

## 2017-05-09 PROCEDURE — 4004F PT TOBACCO SCREEN RCVD TLK: CPT | Performed by: NURSE PRACTITIONER

## 2017-05-09 PROCEDURE — G8420 CALC BMI NORM PARAMETERS: HCPCS | Performed by: NURSE PRACTITIONER

## 2017-05-09 PROCEDURE — 99214 OFFICE O/P EST MOD 30 MIN: CPT | Performed by: NURSE PRACTITIONER

## 2017-05-09 PROCEDURE — 4040F PNEUMOC VAC/ADMIN/RCVD: CPT | Mod: 8P | Performed by: NURSE PRACTITIONER

## 2017-05-09 PROCEDURE — 1101F PT FALLS ASSESS-DOCD LE1/YR: CPT | Performed by: NURSE PRACTITIONER

## 2017-05-09 RX ORDER — OMEPRAZOLE 20 MG/1
20 CAPSULE, DELAYED RELEASE ORAL DAILY
Qty: 90 CAP | Refills: 3 | Status: SHIPPED | OUTPATIENT
Start: 2017-05-09 | End: 2018-05-17 | Stop reason: SDUPTHER

## 2017-05-09 RX ORDER — ATORVASTATIN CALCIUM 20 MG/1
20 TABLET, FILM COATED ORAL DAILY
Qty: 90 TAB | Refills: 3 | Status: SHIPPED | OUTPATIENT
Start: 2017-05-09 | End: 2018-05-17 | Stop reason: SDUPTHER

## 2017-05-09 NOTE — ASSESSMENT & PLAN NOTE
Patient has elevated fasting glucose on recent labs in which follow-up hemoglobin A1c was ordered. Today this is 5.8. This is consistent with prediabetes and I discussed with him the treatment for this is prevention by eating healthy diet, exercising regularly and maintaining healthy weight. We did discuss at length proper diet including fresh fruits and vegetables, lean meats and less saturated fats and carbs. He is going to work on this and we will recheck hemoglobin A1c in 6 months.

## 2017-05-09 NOTE — MR AVS SNAPSHOT
"        Elias Alberts   2017 1:20 PM   Office Visit   MRN: [id number]    Department:  [facility]   Dept Phone:  [phone]    Description:  Male : 1933   Provider:  RENÉ Good           Reason for Visit     Results fv labs-HLD, PAD    Medication Refill all meds      Allergies as of 2017     No Known Allergies      You were diagnosed with     Essential hypertension   [8505636]       Elevated fasting glucose   [738644]       Familial hypercholesterolemia   [925227]       Familial hypercholesterolemia   [634787]   Uncontrolled, starting statin    Gastritis without bleeding, unspecified chronicity, unspecified gastritis type   [2932737]         Vital Signs     Blood Pressure Pulse Temperature Respirations Height Weight    120/68 mmHg 100 36.5 °C (97.7 °F) 16 1.778 m (5' 10\") 62.596 kg (138 lb)    Body Mass Index Oxygen Saturation Smoking Status             19.80 kg/m2 97% Current Every Day Smoker         Basic Information     Date Of Birth Sex Race Ethnicity Preferred Language    1933 Male White Non- English      Your appointments     Nov 10, 2017 11:40 AM   Established Patient with RENÉ Good   [address] [address] [phone]           You will be receiving a confirmation call a few days before your appointment from our automated call confirmation system.              Problem List              ICD-10-CM Priority Class Noted - Resolved    Tobacco abuse Z72.0   Unknown - Present    HTN (hypertension) I10   Unknown - Present    Gastritis K29.70   2014 - Present    Advance care planning Z71.89   3/23/2016 - Present    Claudication (CMS-HCC) I73.9   2017 - Present    Elevated fasting glucose R73.01   3/9/2017 - Present    Renal insufficiency N28.9   3/9/2017 - Present    Familial hypercholesterolemia E78.01   3/9/2017 - Present    Peripheral arterial disease (CMS-HCC) I73.9 "   3/9/2017 - Present      Health Maintenance        Date Due Completion Dates    IMM DTaP/Tdap/Td Vaccine (1 - Tdap) 11/19/1952 ---    IMM ZOSTER VACCINE 11/19/1993 ---    IMM PNEUMOCOCCAL 65+ (ADULT) LOW/MEDIUM RISK SERIES (1 of 2 - PCV13) 11/19/1998 ---            Current Immunizations     No immunizations on file.      Below and/or attached are the medications your provider expects you to take. Review all of your home medications and newly ordered medications with your provider and/or pharmacist. Follow medication instructions as directed by your provider and/or pharmacist. Please keep your medication list with you and share with your provider. Update the information when medications are discontinued, doses are changed, or new medications (including over-the-counter products) are added; and carry medication information at all times in the event of emergency situations     Allergies:  No Known Allergies          Medications  Valid as of: May 09, 2017 -  1:54 PM    Generic Name Brand Name Tablet Size Instructions for use    Atorvastatin Calcium (Tab) LIPITOR 20 MG Take 1 Tab by mouth every day.        Cilostazol (Tab) PLETAL 100 MG Take 1 Tab by mouth 2 times a day.        Felodipine (TABLET SR 24 HR) PLENDIL 10 MG Take 1 Tab by mouth every day.        Lisinopril (Tab) PRINIVIL 20 MG TAKE 1 TABLET BY MOUTH DAILY.        Omeprazole (CAPSULE DELAYED RELEASE) PRILOSEC 20 MG Take 1 Cap by mouth every day.        .                 Medicines prescribed today were sent to:     [facility] [address] [place] [address]    Phone: [phone] Fax: [phone]    Open 24 Hours?: No      Medication refill instructions:       If your prescription bottle indicates you have medication refills left, it is not necessary to call your provider’s office. Please contact your pharmacy and they will refill your medication.    If your prescription bottle indicates you do not  have any refills left, you may request refills at any time through one of the following ways: The online Volvant system (except Urgent Care), by calling your provider’s office, or by asking your pharmacy to contact your provider’s office with a refill request. Medication refills are processed only during regular business hours and may not be available until the next business day. Your provider may request additional information or to have a follow-up visit with you prior to refilling your medication.   *Please Note: Medication refills are assigned a new Rx number when refilled electronically. Your pharmacy may indicate that no refills were authorized even though a new prescription for the same medication is available at the pharmacy. Please request the medicine by name with the pharmacy before contacting your provider for a refill.        Your To Do List     Future Labs/Procedures Complete By Expires    COMP METABOLIC PANEL  As directed 5/9/2018    HEMOGLOBIN A1C  As directed 5/9/2018    LIPID PROFILE  As directed 5/9/2018      Instructions    Follow up with me in 6 months, with labs before visit    Continue on the medications    Try to decrease the carb intake--less bread, rice, potatoes, pasta, etc. Eat more veggies. If you eat meats, eat lean meats--chicken, lean beef, pork.               Volvant Access Code: [email or web address]  Expires: 6/8/2017  1:54 PM    Volvant  A secure, online tool to manage your health information     [employer]’s Volvant® is a secure, online tool that connects you to your personalized health information from the privacy of your home -- day or night - making it very easy for you to manage your healthcare. Once the activation process is completed, you can even access your medical information using the Volvant jose, which is available for free in the Apple Jose store or Google Play store.     Volvant provides the following levels of access (as shown below):   My Chart Features   [facility]  Primary Care Doctor [facility]  Specialists [facility]  Urgent  Care Non-[facility]  Primary Care  Doctor   Email your healthcare team securely and privately 24/7 X X X    Manage appointments: schedule your next appointment; view details of past/upcoming appointments X      Request prescription refills. X      View recent personal medical records, including lab and immunizations X X X X   View health record, including health history, allergies, medications X X X X   Read reports about your outpatient visits, procedures, consult and ER notes X X X X   See your discharge summary, which is a recap of your hospital and/or ER visit that includes your diagnosis, lab results, and care plan. X X       How to register for SpaceList:  1. Go to  [email or web address].  2. Click on the Sign Up Now box, which takes you to the New Member Sign Up page. You will need to provide the following information:  a. Enter your SpaceList Access Code exactly as it appears at the top of this page. (You will not need to use this code after you’ve completed the sign-up process. If you do not sign up before the expiration date, you must request a new code.)   b. Enter your date of birth.   c. Enter your home email address.   d. Click Submit, and follow the next screen’s instructions.  3. Create a SpaceList ID. This will be your SpaceList login ID and cannot be changed, so think of one that is secure and easy to remember.  4. Create a SpaceList password. You can change your password at any time.  5. Enter your Password Reset Question and Answer. This can be used at a later time if you forget your password.   6. Enter your e-mail address. This allows you to receive e-mail notifications when new information is available in SpaceList.  7. Click Sign Up. You can now view your health information.    For assistance activating your SpaceList account, call [phone]        Quit Tobacco Information     Do you want to quit using tobacco?    Quitting tobacco decreases  risks of cancer, heart and lung disease, increases life expectancy, improves sense of taste and smell, and increases spending money, among other benefits.    If you are thinking about quitting, we can help.  • [facility] Quit Tobacco Program: 351.823.2282  o Program occurs weekly for four weeks and includes pharmacist consultation on products to support quitting smoking or chewing tobacco. A provider referral is needed for pharmacist consultation.  • Tobacco Users Help Hotline: 8-142-QUIT-NOW (145-6973) or https://[place].quitlogix.org/  o Free, confidential telephone and online coaching for [place] residents. Sessions are designed on a schedule that is convenient for you. Eligible clients receive free nicotine replacement therapy.  • Nationally: www.smokefree.gov  o Information and professional assistance to support both immediate and long-term needs as you become, and remain, a non-smoker. Smokefree.gov allows you to choose the help that best fits your needs.

## 2017-05-09 NOTE — PROGRESS NOTES
Chief Complaint   Patient presents with   • Results     fv labs-HLD, PAD   • Medication Refill     all meds         This is a 83 y.o.male patient that presents today with the following: Follow-up visit, review labs    HTN (hypertension)  Chronic in nature, stable and well-controlled on current regimen. Pressure is 120/68, he denies symptoms of hypertension. He will be due for labs in 6 months, at that time we will have him follow up. He does not need refills at this time, they can call for refills when needed. He is to continue with healthy diet, regular physical activity and maintaining his healthy weight. He was encouraged to stop smoking and he was offered assistance but declines.    Gastritis  Chronic in nature, stable and well controlled on daily omeprazole. He tolerates this medication well with no significant emotion side effects. He does need refills, this was called in for him. He was instructed to take this on an empty stomach first thing in the morning. We will monitor this at least every 6 months and as needed. He was advised to avoid aggravating foods and activities.    Familial hypercholesterolemia  This is chronic in nature, stable and very well controlled on atorvastatin 20 mg daily. His LDL has decreased down to well under 70, this is down from 107 in December 2016. He has increased risk due to PAD, CAD and hypertension. He is tolerating medication well with no significant bothersome side effects. We will recheck lipids in 6 months.    Elevated fasting glucose  Patient has elevated fasting glucose on recent labs in which follow-up hemoglobin A1c was ordered. Today this is 5.8. This is consistent with prediabetes and I discussed with him the treatment for this is prevention by eating healthy diet, exercising regularly and maintaining healthy weight. We did discuss at length proper diet including fresh fruits and vegetables, lean meats and less saturated fats and carbs. He is going to work on this and  we will recheck hemoglobin A1c in 6 months.    Tobacco abuse  Chronic in nature, uncontrolled. He has no desire to quit smoking and declines assistance offered. He understands the risk associated with ongoing tobacco use.      Hospital Outpatient Visit on 04/28/2017   Component Date Value   • Glycohemoglobin 04/28/2017 5.8*   • Est Avg Glucose 04/28/2017 120    • Cholesterol,Tot 04/28/2017 116    • Triglycerides 04/28/2017 61    • HDL 04/28/2017 55    • LDL 04/28/2017 49    • Sodium 04/28/2017 136    • Potassium 04/28/2017 4.2    • Chloride 04/28/2017 103    • Co2 04/28/2017 25    • Anion Gap 04/28/2017 8.0    • Glucose 04/28/2017 129*   • Bun 04/28/2017 15    • Creatinine 04/28/2017 0.95    • Calcium 04/28/2017 9.1    • AST(SGOT) 04/28/2017 24    • ALT(SGPT) 04/28/2017 21    • Alkaline Phosphatase 04/28/2017 66    • Total Bilirubin 04/28/2017 0.9    • Albumin 04/28/2017 4.3    • Total Protein 04/28/2017 7.1    • Globulin 04/28/2017 2.8    • A-G Ratio 04/28/2017 1.5    • GFR If  04/28/2017 >60    • GFR If Non  Ameri* 04/28/2017 >60          clinical course has been stable    Past Medical History   Diagnosis Date   • Hypertension    • Tobacco abuse    • HTN (hypertension)    • GERD (gastroesophageal reflux disease)        Past Surgical History   Procedure Laterality Date   • Endoscopy small intestine     • Cataract phaco with iol  5/15/2012     Performed by CORBY MRIELES at [facility]       No family history on file.    Review of patient's allergies indicates no known allergies.    Current Outpatient Prescriptions Ordered in [facility]   Medication Sig Dispense Refill   • atorvastatin (LIPITOR) 20 MG Tab Take 1 Tab by mouth every day. 90 Tab 3   • omeprazole (PRILOSEC) 20 MG delayed-release capsule Take 1 Cap by mouth every day. 90 Cap 3   • lisinopril (PRINIVIL) 20 MG Tab TAKE 1 TABLET BY MOUTH DAILY. 90 Tab 1   • felodipine (PLENDIL) 10 MG TABLET SR 24 HR Take 1 Tab by mouth every  "day. 90 Tab 3   • cilostazol (PLETAL) 100 MG Tab Take 1 Tab by mouth 2 times a day. 60 Tab 3     No current Epic-ordered facility-administered medications on file.       Constitutional ROS: No unexpected change in weight, No fatigue, No unexplained fevers, sweats, or chills  Pulmonary ROS: No chronic cough, sputum, or hemoptysis, No shortness of breath, No recent change in breathing, Positive for smoker, current every day  Cardiovascular ROS: No chest pain, No edema, No palpitations, Positive for hypertension, per history of present illness. Positive for PAD, per history of present illness  Gastrointestinal ROS: No abdominal pain, No nausea, vomiting, diarrhea, or constipation, no blood in stool, Positive for reflux, controlled  Musculoskeletal/Extremities ROS: No clubbing, No peripheral edema, No pain, redness or swelling on the joints  Neurologic ROS: Normal development, No seizures, No weakness  Endocrine ROS: Positive per history of present illness    Physical exam:  /68 mmHg  Pulse 100  Temp(Src) 36.5 °C (97.7 °F)  Resp 16  Ht 1.778 m (5' 10\")  Wt 62.596 kg (138 lb)  BMI 19.80 kg/m2  SpO2 97%  General Appearance: Elderly male, alert, no distress, well-nourished, well-groomed  Skin: Skin color, texture, turgor normal. No rashes or lesions.  Lungs: negative findings: normal respiratory rate and rhythm, lungs clear to auscultation  Heart: negative. RRR without murmur, gallop, or rubs.  No ectopy.  Abdomen: Abdomen soft, non-tender. BS normal. No masses,  No organomegaly  Musculoskeletal: negative findings: no erythema, induration, or nodules, no evidence of joint effusion, strength normal, no deformities present  Neurologic: intact, oriented, mood appropriate, judgment intact. Cranial nerves II through XII grossly intact    Medical decision making/discussion: Patient here for follow-up visit and to review labs. He is to continue on the atorvastatin, he can call for refills when needed. He is to " follow-up with me in 6 months with labs done before visit. Medications were refilled he is taking this prescribed. He was encouraged to stop smoking, assistance was offered. He declines at this time. We discussed decreasing carbohydrates and increasing fresh fruits and vegetables and avoiding high fat meats and saturated fats. He is to continue on the omeprazole as prescribed and he is to avoid aggravating foods and activities.    Elias was seen today for results and medication refill.    Diagnoses and all orders for this visit:    Essential hypertension  -     COMP METABOLIC PANEL; Future  -     LIPID PROFILE; Future    Elevated fasting glucose  -     HEMOGLOBIN A1C; Future    Familial hypercholesterolemia  -     LIPID PROFILE; Future  -     atorvastatin (LIPITOR) 20 MG Tab; Take 1 Tab by mouth every day.    Gastritis without bleeding, unspecified chronicity, unspecified gastritis type  -     omeprazole (PRILOSEC) 20 MG delayed-release capsule; Take 1 Cap by mouth every day.    Tobacco abuse          Please note that this dictation was created using voice recognition software. I have made every reasonable attempt to correct obvious errors, but I expect that there are errors of grammar and possibly content that I did not discover before finalizing the note.

## 2017-05-09 NOTE — PATIENT INSTRUCTIONS
Follow up with me in 6 months, with labs before visit    Continue on the medications    Try to decrease the carb intake--less bread, rice, potatoes, pasta, etc. Eat more veggies. If you eat meats, eat lean meats--chicken, lean beef, pork.

## 2017-05-09 NOTE — ASSESSMENT & PLAN NOTE
Chronic in nature, stable and well-controlled on current regimen. Pressure is 120/68, he denies symptoms of hypertension. He will be due for labs in 6 months, at that time we will have him follow up. He does not need refills at this time, they can call for refills when needed. He is to continue with healthy diet, regular physical activity and maintaining his healthy weight. He was encouraged to stop smoking and he was offered assistance but declines.

## 2017-05-09 NOTE — ASSESSMENT & PLAN NOTE
Chronic in nature, stable and well controlled on daily omeprazole. He tolerates this medication well with no significant emotion side effects. He does need refills, this was called in for him. He was instructed to take this on an empty stomach first thing in the morning. We will monitor this at least every 6 months and as needed. He was advised to avoid aggravating foods and activities.

## 2017-05-09 NOTE — ASSESSMENT & PLAN NOTE
This is chronic in nature, stable and very well controlled on atorvastatin 20 mg daily. His LDL has decreased down to well under 70, this is down from 107 in December 2016. He has increased risk due to PAD, CAD and hypertension. He is tolerating medication well with no significant bothersome side effects. We will recheck lipids in 6 months.

## 2017-05-09 NOTE — ASSESSMENT & PLAN NOTE
Chronic in nature, uncontrolled. He has no desire to quit smoking and declines assistance offered. He understands the risk associated with ongoing tobacco use.

## 2017-06-12 RX ORDER — CILOSTAZOL 100 MG/1
TABLET ORAL
Qty: 180 TAB | Refills: 1 | Status: SHIPPED | OUTPATIENT
Start: 2017-06-12 | End: 2017-12-04 | Stop reason: SDUPTHER

## 2017-09-28 RX ORDER — LISINOPRIL 20 MG/1
TABLET ORAL
Qty: 90 TAB | Refills: 0 | Status: SHIPPED | OUTPATIENT
Start: 2017-09-28 | End: 2017-12-28 | Stop reason: SDUPTHER

## 2017-11-01 ENCOUNTER — PATIENT OUTREACH (OUTPATIENT)
Dept: HEALTH INFORMATION MANAGEMENT | Facility: OTHER | Age: 82
End: 2017-11-01

## 2017-11-01 NOTE — PROGRESS NOTES
Attempt #:1    WebIZ Checked & Epic Updated: Yes  · WebIZ Recommendations: FLU, PNEUMOVAX (PPSV23), TDAP and ZOSTAVAX (Shingles)  · Is patient due for Tdap? YES. Patient was not notified of copay/out of pocket cost.  · Is patient due for Shingles? YES. Patient was not notified of copay/out of pocket cost.  [facility] Verified: yes  Verify PCP: yes    Communication Preference Obtained: yes     Review Care Team: yes    Annual Wellness Visit Scheduling  1. Scheduling Status:Scheduled        Care Gap Scheduling (Attempt to Schedule EACH Overdue Care Gap!)     Health Maintenance Due   Topic Date Due   • Annual Wellness Visit  11/19/1933   • IMM DTaP/Tdap/Td Vaccine (1 - Tdap) 11/19/1952   • IMM ZOSTER VACCINE  11/19/1993   • IMM PNEUMOCOCCAL 65+ (ADULT) LOW/MEDIUM RISK SERIES (1 of 2 - PCV13) 11/19/1998   • IMM INFLUENZA (1) 09/01/2017        Scheduled patient for Annual Wellness Visit  Patient prefers to discuss Immunizations: FLU, PNEUMOVAX (PPSV23), TDAP and ZOSTAVAX (Shingles) with PCP.         Exaprotect Activation: sent activation code  Exaprotect Jose: no  Virtual Visits: no  Opt In to Text Messages: no

## 2017-11-09 ENCOUNTER — HOSPITAL ENCOUNTER (OUTPATIENT)
Dept: LAB | Facility: MEDICAL CENTER | Age: 82
End: 2017-11-09
Attending: NURSE PRACTITIONER
Payer: MEDICARE

## 2017-11-09 DIAGNOSIS — R73.01 ELEVATED FASTING GLUCOSE: ICD-10-CM

## 2017-11-09 DIAGNOSIS — I10 ESSENTIAL HYPERTENSION: ICD-10-CM

## 2017-11-09 DIAGNOSIS — E78.01 FAMILIAL HYPERCHOLESTEROLEMIA: ICD-10-CM

## 2017-11-09 LAB
ALBUMIN SERPL BCP-MCNC: 4.2 G/DL (ref 3.2–4.9)
ALBUMIN/GLOB SERPL: 1.4 G/DL
ALP SERPL-CCNC: 91 U/L (ref 30–99)
ALT SERPL-CCNC: 14 U/L (ref 2–50)
ANION GAP SERPL CALC-SCNC: 9 MMOL/L (ref 0–11.9)
AST SERPL-CCNC: 20 U/L (ref 12–45)
BILIRUB SERPL-MCNC: 0.6 MG/DL (ref 0.1–1.5)
BUN SERPL-MCNC: 18 MG/DL (ref 8–22)
CALCIUM SERPL-MCNC: 9.4 MG/DL (ref 8.5–10.5)
CHLORIDE SERPL-SCNC: 104 MMOL/L (ref 96–112)
CHOLEST SERPL-MCNC: 110 MG/DL (ref 100–199)
CO2 SERPL-SCNC: 23 MMOL/L (ref 20–33)
CREAT SERPL-MCNC: 0.85 MG/DL (ref 0.5–1.4)
EST. AVERAGE GLUCOSE BLD GHB EST-MCNC: 123 MG/DL
GFR SERPL CREATININE-BSD FRML MDRD: >60 ML/MIN/1.73 M 2
GLOBULIN SER CALC-MCNC: 3.1 G/DL (ref 1.9–3.5)
GLUCOSE SERPL-MCNC: 115 MG/DL (ref 65–99)
HBA1C MFR BLD: 5.9 % (ref 0–5.6)
HDLC SERPL-MCNC: 49 MG/DL
LDLC SERPL CALC-MCNC: 52 MG/DL
POTASSIUM SERPL-SCNC: 4.5 MMOL/L (ref 3.6–5.5)
PROT SERPL-MCNC: 7.3 G/DL (ref 6–8.2)
SODIUM SERPL-SCNC: 136 MMOL/L (ref 135–145)
TRIGL SERPL-MCNC: 47 MG/DL (ref 0–149)

## 2017-11-09 PROCEDURE — 83036 HEMOGLOBIN GLYCOSYLATED A1C: CPT

## 2017-11-09 PROCEDURE — 80061 LIPID PANEL: CPT

## 2017-11-09 PROCEDURE — 80053 COMPREHEN METABOLIC PANEL: CPT

## 2017-11-09 PROCEDURE — 36415 COLL VENOUS BLD VENIPUNCTURE: CPT

## 2017-11-10 ENCOUNTER — OFFICE VISIT (OUTPATIENT)
Dept: MEDICAL GROUP | Facility: PHYSICIAN GROUP | Age: 82
End: 2017-11-10
Payer: MEDICARE

## 2017-11-10 VITALS
SYSTOLIC BLOOD PRESSURE: 112 MMHG | DIASTOLIC BLOOD PRESSURE: 70 MMHG | WEIGHT: 138 LBS | BODY MASS INDEX: 19.76 KG/M2 | OXYGEN SATURATION: 96 % | TEMPERATURE: 97.9 F | RESPIRATION RATE: 16 BRPM | HEIGHT: 70 IN | HEART RATE: 114 BPM

## 2017-11-10 DIAGNOSIS — R73.01 ELEVATED FASTING GLUCOSE: ICD-10-CM

## 2017-11-10 DIAGNOSIS — E78.01 FAMILIAL HYPERCHOLESTEROLEMIA: ICD-10-CM

## 2017-11-10 DIAGNOSIS — Z72.0 TOBACCO ABUSE: ICD-10-CM

## 2017-11-10 DIAGNOSIS — I10 ESSENTIAL HYPERTENSION: ICD-10-CM

## 2017-11-10 PROCEDURE — 99214 OFFICE O/P EST MOD 30 MIN: CPT | Performed by: NURSE PRACTITIONER

## 2017-11-10 NOTE — ASSESSMENT & PLAN NOTE
This is a chronic condition, stable and well controlled on felodipine and lisinopril. Patient's blood pressure is 112/70 and he denies symptoms of hypertension. He does not need refills, but can call as needed. He is to follow-up with me in 6 months, sooner if needed.

## 2017-11-10 NOTE — ASSESSMENT & PLAN NOTE
This is a chronic condition, uncontrolled. Patient smokes on a daily basis and he understands the risks of ongoing tobacco use. He does not express any desire to stop smoking and declines assistance that has been offered.

## 2017-11-10 NOTE — ASSESSMENT & PLAN NOTE
This is a chronic condition, stable and very well controlled on atorvastatin 20 mg daily. His profile is as follows:  Component      Latest Ref Rng & Units 11/9/2017           8:41 AM   Cholesterol,Tot      100 - 199 mg/dL 110   Triglycerides      0 - 149 mg/dL 47   HDL      >=40 mg/dL 49   LDL      <100 mg/dL 52   He tolerates medications well with no significant bothersome side effects. He does not need refills, but can call as needed. We will recheck labs in one year.

## 2017-11-10 NOTE — PROGRESS NOTES
Chief Complaint   Patient presents with   • Hypertension     fv labs         This is a 83 y.o.male patient that presents today with the following:Follow-up visit, review labs    Elevated fasting glucose  Patient has past history of elevated fasting glucose. He has a hemoglobin A1c of 5.9%, discussed with him that this is consistent with prediabetes. He was instructed to continue eating healthy, exercises regularly and maintain healthy weight. We will recheck labs in 6 months.    Familial hypercholesterolemia  This is a chronic condition, stable and very well controlled on atorvastatin 20 mg daily. His profile is as follows:  Component      Latest Ref Rng & Units 11/9/2017           8:41 AM   Cholesterol,Tot      100 - 199 mg/dL 110   Triglycerides      0 - 149 mg/dL 47   HDL      >=40 mg/dL 49   LDL      <100 mg/dL 52   He tolerates medications well with no significant bothersome side effects. He does not need refills, but can call as needed. We will recheck labs in one year.    HTN (hypertension)  This is a chronic condition, stable and well controlled on felodipine and lisinopril. Patient's blood pressure is 112/70 and he denies symptoms of hypertension. He does not need refills, but can call as needed. He is to follow-up with me in 6 months, sooner if needed.    Tobacco abuse  This is a chronic condition, uncontrolled. Patient smokes on a daily basis and he understands the risks of ongoing tobacco use. He does not express any desire to stop smoking and declines assistance that has been offered.      Hospital Outpatient Visit on 11/09/2017   Component Date Value   • Sodium 11/09/2017 136    • Potassium 11/09/2017 4.5    • Chloride 11/09/2017 104    • Co2 11/09/2017 23    • Anion Gap 11/09/2017 9.0    • Glucose 11/09/2017 115*   • Bun 11/09/2017 18    • Creatinine 11/09/2017 0.85    • Calcium 11/09/2017 9.4    • AST(SGOT) 11/09/2017 20    • ALT(SGPT) 11/09/2017 14    • Alkaline Phosphatase 11/09/2017 91    • Total  Bilirubin 11/09/2017 0.6    • Albumin 11/09/2017 4.2    • Total Protein 11/09/2017 7.3    • Globulin 11/09/2017 3.1    • A-G Ratio 11/09/2017 1.4    • Glycohemoglobin 11/09/2017 5.9*   • Est Avg Glucose 11/09/2017 123    • Cholesterol,Tot 11/09/2017 110    • Triglycerides 11/09/2017 47    • HDL 11/09/2017 49    • LDL 11/09/2017 52    • GFR If  11/09/2017 >60    • GFR If Non  Ameri* 11/09/2017 >60          clinical course has been stable    Past Medical History:   Diagnosis Date   • GERD (gastroesophageal reflux disease)    • HTN (hypertension)    • Hypertension    • Tobacco abuse        Past Surgical History:   Procedure Laterality Date   • CATARACT PHACO WITH IOL  5/15/2012    Performed by CORBY MIRELES at [facility]   • ENDOSCOPY SMALL INTESTINE         No family history on file.    Review of patient's allergies indicates no known allergies.    Current Outpatient Prescriptions Ordered in [facility]   Medication Sig Dispense Refill   • lisinopril (PRINIVIL) 20 MG Tab TAKE 1 TABLET BY MOUTH DAILY. 90 Tab 0   • cilostazol (PLETAL) 100 MG Tab TAKE 1 TABLET BY MOUTH 2 TIMES A DAY.  180 Tab 1   • atorvastatin (LIPITOR) 20 MG Tab Take 1 Tab by mouth every day. 90 Tab 3   • omeprazole (PRILOSEC) 20 MG delayed-release capsule Take 1 Cap by mouth every day. 90 Cap 3   • felodipine (PLENDIL) 10 MG TABLET SR 24 HR Take 1 Tab by mouth every day. 90 Tab 3     No current Epic-ordered facility-administered medications on file.        Constitutional ROS: No unexpected change in weight, No weakness, No unexplained fevers, sweats, or chills  Pulmonary ROS: No chronic cough, sputum, or hemoptysis, No shortness of breath, No recent change in breathing, Positive for smoker, currently rating  Cardiovascular ROS: No chest pain, No edema, No palpitations, Positive for hypertension, controlled  Gastrointestinal ROS: No abdominal pain, No nausea, vomiting, diarrhea, or constipation, no blood in  "stool  Musculoskeletal/Extremities ROS: No clubbing, No peripheral edema, No pain, redness or swelling on the joints  Neurologic ROS: Normal development, No seizures, No weakness  Endocrine ROS: Positive per history of present illness    Physical exam:  /70   Pulse (!) 114   Temp 36.6 °C (97.9 °F)   Resp 16   Ht 1.778 m (5' 10\")   Wt 62.6 kg (138 lb)   SpO2 96%   BMI 19.80 kg/m²   General Appearance: Elderly male, alert, no distress, well-nourished, well-groomed  Skin: Skin color, texture, turgor normal. No rashes or lesions.  Lungs: negative findings: normal respiratory rate and rhythm, lungs clear to auscultation  Heart: negative. RRR without murmur, gallop, or rubs.  No ectopy.  Abdomen: Abdomen soft, non-tender. BS normal. No masses,  No organomegaly  Musculoskeletal: negative findings: ROM of all joints is normal, no evidence of joint instability, strength normal, no deformities present  Neurologic: intact, oriented, mood appropriate, judgment intact. Cranial nerves II-12 grossly intact    Medical decision making/discussion: Patient to follow-up with me in 6 months with labs before visit. He declines flu shot today. He declines offer for assistance with tobacco cessation.    Elias was seen today for hypertension.    Diagnoses and all orders for this visit:    Tobacco abuse    Essential hypertension    Elevated fasting glucose    Familial hypercholesterolemia          Please note that this dictation was created using voice recognition software. I have made every reasonable attempt to correct obvious errors, but I expect that there are errors of grammar and possibly content that I did not discover before finalizing the note.        "

## 2017-12-05 RX ORDER — CILOSTAZOL 100 MG/1
TABLET ORAL
Qty: 180 TAB | Refills: 1 | Status: SHIPPED | OUTPATIENT
Start: 2017-12-05 | End: 2018-05-23 | Stop reason: SDUPTHER

## 2017-12-08 ENCOUNTER — TELEPHONE (OUTPATIENT)
Dept: MEDICAL GROUP | Facility: PHYSICIAN GROUP | Age: 82
End: 2017-12-08

## 2017-12-28 RX ORDER — LISINOPRIL 20 MG/1
TABLET ORAL
Qty: 90 TAB | Refills: 1 | Status: SHIPPED | OUTPATIENT
Start: 2017-12-28 | End: 2018-05-17 | Stop reason: SDUPTHER

## 2017-12-28 NOTE — TELEPHONE ENCOUNTER
Was the patient seen in the last year in this department? Yes     Does patient have an active prescription for medications requested? No     Received Request Via: Pharmacy    Pt met protocol?: Yes     Last OV 11/2017  BP Readings from Last 1 Encounters:   11/10/17 112/70

## 2017-12-28 NOTE — TELEPHONE ENCOUNTER
Refill X 6 months, sent to pharmacy.Pt. Seen in the last 6 months per protocol.   Lab Results   Component Value Date/Time    SODIUM 136 11/09/2017 08:41 AM    POTASSIUM 4.5 11/09/2017 08:41 AM    CHLORIDE 104 11/09/2017 08:41 AM    CO2 23 11/09/2017 08:41 AM    GLUCOSE 115 (H) 11/09/2017 08:41 AM    BUN 18 11/09/2017 08:41 AM    CREATININE 0.85 11/09/2017 08:41 AM

## 2018-03-20 DIAGNOSIS — I10 ESSENTIAL HYPERTENSION: ICD-10-CM

## 2018-03-20 RX ORDER — FELODIPINE 10 MG/1
10 TABLET, EXTENDED RELEASE ORAL
Qty: 90 TAB | Refills: 0 | Status: SHIPPED | OUTPATIENT
Start: 2018-03-20 | End: 2018-05-17 | Stop reason: SDUPTHER

## 2018-03-20 NOTE — TELEPHONE ENCOUNTER
Was the patient seen in the last year in this department? Yes     Does patient have an active prescription for medications requested? No     Received Request Via: Pharmacy      Pt met protocol?: Yes pt last ov 11/2017   BP Readings from Last 1 Encounters:   11/10/17 112/70

## 2018-05-17 DIAGNOSIS — I10 ESSENTIAL HYPERTENSION: ICD-10-CM

## 2018-05-17 DIAGNOSIS — E78.01 FAMILIAL HYPERCHOLESTEROLEMIA: ICD-10-CM

## 2018-05-17 DIAGNOSIS — K29.70 GASTRITIS WITHOUT BLEEDING, UNSPECIFIED CHRONICITY, UNSPECIFIED GASTRITIS TYPE: ICD-10-CM

## 2018-05-17 RX ORDER — LISINOPRIL 20 MG/1
TABLET ORAL
Qty: 90 TAB | Refills: 1 | Status: SHIPPED | OUTPATIENT
Start: 2018-05-17 | End: 2018-05-21 | Stop reason: SDUPTHER

## 2018-05-17 RX ORDER — ATORVASTATIN CALCIUM 20 MG/1
20 TABLET, FILM COATED ORAL DAILY
Qty: 90 TAB | Refills: 1 | Status: SHIPPED | OUTPATIENT
Start: 2018-05-17 | End: 2018-05-21 | Stop reason: SDUPTHER

## 2018-05-17 RX ORDER — OMEPRAZOLE 20 MG/1
20 CAPSULE, DELAYED RELEASE ORAL DAILY
Qty: 90 CAP | Refills: 1 | Status: SHIPPED | OUTPATIENT
Start: 2018-05-17 | End: 2018-05-21 | Stop reason: SDUPTHER

## 2018-05-17 RX ORDER — FELODIPINE 10 MG/1
10 TABLET, EXTENDED RELEASE ORAL
Qty: 90 TAB | Refills: 1 | Status: SHIPPED | OUTPATIENT
Start: 2018-05-17 | End: 2018-05-21 | Stop reason: SDUPTHER

## 2018-05-17 NOTE — TELEPHONE ENCOUNTER
Was the patient seen in the last year in this department? Yes     Does patient have an active prescription for medications requested? No     Received Request Via: Pharmacy      Pt met protocol?: Yes, last ov 11/17    Lab Results  Component Value Date/Time   CHOLSTRLTOT 110 11/09/2017 0841   TRIGLYCERIDE 47 11/09/2017 0841   HDL 49 11/09/2017 0841   LDL 52 11/09/2017 0841       BP Readings from Last 1 Encounters:   11/10/17 112/70        Requesting new prescriptions be sent to [facility] in [place], [place]    Telephone#  [phone]  Fax #[phone]

## 2018-05-17 NOTE — TELEPHONE ENCOUNTER
Pt has had OV within the 12 month protocol and lipid panel is current. 6 month supply sent to pharmacy.   Lab Results   Component Value Date/Time    CHOLSTRLTOT 110 11/09/2017 08:41 AM    LDL 52 11/09/2017 08:41 AM    HDL 49 11/09/2017 08:41 AM    TRIGLYCERIDE 47 11/09/2017 08:41 AM       Lab Results   Component Value Date/Time    SODIUM 136 11/09/2017 08:41 AM    POTASSIUM 4.5 11/09/2017 08:41 AM    CHLORIDE 104 11/09/2017 08:41 AM    CO2 23 11/09/2017 08:41 AM    GLUCOSE 115 (H) 11/09/2017 08:41 AM    BUN 18 11/09/2017 08:41 AM    CREATININE 0.85 11/09/2017 08:41 AM     Lab Results   Component Value Date/Time    ALKPHOSPHAT 91 11/09/2017 08:41 AM    ASTSGOT 20 11/09/2017 08:41 AM    ALTSGPT 14 11/09/2017 08:41 AM    TBILIRUBIN 0.6 11/09/2017 08:41 AM

## 2018-05-21 DIAGNOSIS — I10 ESSENTIAL HYPERTENSION: ICD-10-CM

## 2018-05-21 DIAGNOSIS — E78.01 FAMILIAL HYPERCHOLESTEROLEMIA: ICD-10-CM

## 2018-05-21 DIAGNOSIS — K29.70 GASTRITIS WITHOUT BLEEDING, UNSPECIFIED CHRONICITY, UNSPECIFIED GASTRITIS TYPE: ICD-10-CM

## 2018-05-21 RX ORDER — ATORVASTATIN CALCIUM 20 MG/1
20 TABLET, FILM COATED ORAL DAILY
Qty: 90 TAB | Refills: 1 | Status: SHIPPED | OUTPATIENT
Start: 2018-05-21 | End: 2018-11-26 | Stop reason: SDUPTHER

## 2018-05-21 RX ORDER — LISINOPRIL 20 MG/1
TABLET ORAL
Qty: 90 TAB | Refills: 1 | Status: SHIPPED | OUTPATIENT
Start: 2018-05-21 | End: 2018-12-08 | Stop reason: SDUPTHER

## 2018-05-21 RX ORDER — OMEPRAZOLE 20 MG/1
20 CAPSULE, DELAYED RELEASE ORAL DAILY
Qty: 90 CAP | Refills: 1 | Status: SHIPPED | OUTPATIENT
Start: 2018-05-21 | End: 2019-03-05 | Stop reason: SDUPTHER

## 2018-05-21 RX ORDER — FELODIPINE 10 MG/1
10 TABLET, EXTENDED RELEASE ORAL
Qty: 90 TAB | Refills: 1 | Status: SHIPPED | OUTPATIENT
Start: 2018-05-21 | End: 2018-12-08 | Stop reason: SDUPTHER

## 2018-08-30 RX ORDER — CILOSTAZOL 100 MG/1
TABLET ORAL
Qty: 180 TAB | Refills: 0 | Status: SHIPPED | OUTPATIENT
Start: 2018-08-30 | End: 2018-11-26 | Stop reason: SDUPTHER

## 2018-10-01 ENCOUNTER — PATIENT OUTREACH (OUTPATIENT)
Dept: HEALTH INFORMATION MANAGEMENT | Facility: OTHER | Age: 83
End: 2018-10-01

## 2018-10-01 NOTE — PROGRESS NOTES
Person who answered the phone stated they were not interested and hung up. Did not verify demos, unable to address anything or let them know why we were calling

## 2018-11-26 DIAGNOSIS — E78.01 FAMILIAL HYPERCHOLESTEROLEMIA: ICD-10-CM

## 2018-11-26 NOTE — TELEPHONE ENCOUNTER
Was the patient seen in the last year in this department? No     Does patient have an active prescription for medications requested? Yes    Received Request Via: Pharmacy    Last office visit: 11/10/2017

## 2018-11-29 RX ORDER — ATORVASTATIN CALCIUM 20 MG/1
TABLET, FILM COATED ORAL
Qty: 90 TAB | Refills: 0 | Status: SHIPPED | OUTPATIENT
Start: 2018-11-29 | End: 2019-03-05 | Stop reason: SDUPTHER

## 2018-11-29 RX ORDER — CILOSTAZOL 100 MG/1
TABLET ORAL
Qty: 180 TAB | Refills: 0 | Status: SHIPPED | OUTPATIENT
Start: 2018-11-29 | End: 2019-03-05 | Stop reason: SDUPTHER

## 2018-12-08 DIAGNOSIS — I10 ESSENTIAL HYPERTENSION: ICD-10-CM

## 2018-12-10 RX ORDER — FELODIPINE 10 MG/1
TABLET, EXTENDED RELEASE ORAL
Qty: 90 TAB | Refills: 0 | Status: SHIPPED | OUTPATIENT
Start: 2018-12-10 | End: 2019-03-05 | Stop reason: SDUPTHER

## 2018-12-10 RX ORDER — LISINOPRIL 20 MG/1
TABLET ORAL
Qty: 90 TAB | Refills: 0 | Status: SHIPPED | OUTPATIENT
Start: 2018-12-10 | End: 2019-03-05 | Stop reason: SDUPTHER

## 2018-12-10 NOTE — TELEPHONE ENCOUNTER
Pt was recently told to make an appt. Will send 3 months to pharmacy.    
Was the patient seen in the last year in this department? No     Does patient have an active prescription for medications requested? No     Received Request Via: Pharmacy      Pt met protocol?: No    Pt last ov 11/17 has no upcoming appt    BP Readings from Last 1 Encounters:   11/10/17 112/70       
Adequate: hears normal conversation without difficulty

## 2019-02-25 DIAGNOSIS — E78.01 FAMILIAL HYPERCHOLESTEROLEMIA: ICD-10-CM

## 2019-02-26 RX ORDER — ATORVASTATIN CALCIUM 20 MG/1
TABLET, FILM COATED ORAL
Refills: 0 | OUTPATIENT
Start: 2019-02-26

## 2019-02-26 RX ORDER — CILOSTAZOL 100 MG/1
TABLET ORAL
Refills: 0 | OUTPATIENT
Start: 2019-02-26

## 2019-03-05 ENCOUNTER — OFFICE VISIT (OUTPATIENT)
Dept: MEDICAL GROUP | Facility: PHYSICIAN GROUP | Age: 84
End: 2019-03-05
Payer: MEDICARE

## 2019-03-05 VITALS
DIASTOLIC BLOOD PRESSURE: 86 MMHG | SYSTOLIC BLOOD PRESSURE: 124 MMHG | TEMPERATURE: 98.4 F | HEIGHT: 70 IN | OXYGEN SATURATION: 94 % | BODY MASS INDEX: 19.47 KG/M2 | RESPIRATION RATE: 16 BRPM | WEIGHT: 136 LBS | HEART RATE: 72 BPM

## 2019-03-05 DIAGNOSIS — E78.01 FAMILIAL HYPERCHOLESTEROLEMIA: ICD-10-CM

## 2019-03-05 DIAGNOSIS — Z72.0 TOBACCO ABUSE: ICD-10-CM

## 2019-03-05 DIAGNOSIS — I10 ESSENTIAL HYPERTENSION: ICD-10-CM

## 2019-03-05 DIAGNOSIS — I73.9 PERIPHERAL ARTERIAL DISEASE (HCC): ICD-10-CM

## 2019-03-05 DIAGNOSIS — R73.01 ELEVATED FASTING GLUCOSE: ICD-10-CM

## 2019-03-05 DIAGNOSIS — I73.9 CLAUDICATION (HCC): ICD-10-CM

## 2019-03-05 DIAGNOSIS — Z13.0 ENCOUNTER FOR SCREENING FOR HEMATOLOGIC DISORDER: ICD-10-CM

## 2019-03-05 DIAGNOSIS — K21.9 GASTROESOPHAGEAL REFLUX DISEASE, ESOPHAGITIS PRESENCE NOT SPECIFIED: ICD-10-CM

## 2019-03-05 DIAGNOSIS — N28.9 RENAL INSUFFICIENCY: ICD-10-CM

## 2019-03-05 PROCEDURE — 99214 OFFICE O/P EST MOD 30 MIN: CPT | Performed by: NURSE PRACTITIONER

## 2019-03-05 PROCEDURE — 8041 PR SCP AHA: Performed by: NURSE PRACTITIONER

## 2019-03-05 RX ORDER — ATORVASTATIN CALCIUM 20 MG/1
20 TABLET, FILM COATED ORAL
Qty: 90 TAB | Refills: 3 | Status: SHIPPED | OUTPATIENT
Start: 2019-03-05

## 2019-03-05 RX ORDER — CILOSTAZOL 100 MG/1
TABLET ORAL
Qty: 180 TAB | Refills: 3 | Status: SHIPPED | OUTPATIENT
Start: 2019-03-05

## 2019-03-05 RX ORDER — LISINOPRIL 20 MG/1
TABLET ORAL
Qty: 90 TAB | Refills: 3 | Status: SHIPPED | OUTPATIENT
Start: 2019-03-05

## 2019-03-05 RX ORDER — FELODIPINE 10 MG/1
10 TABLET, EXTENDED RELEASE ORAL
Qty: 90 TAB | Refills: 3 | Status: SHIPPED | OUTPATIENT
Start: 2019-03-05

## 2019-03-05 RX ORDER — OMEPRAZOLE 20 MG/1
20 CAPSULE, DELAYED RELEASE ORAL DAILY
Qty: 90 CAP | Refills: 1 | Status: SHIPPED | OUTPATIENT
Start: 2019-03-05

## 2019-03-05 ASSESSMENT — PATIENT HEALTH QUESTIONNAIRE - PHQ9: CLINICAL INTERPRETATION OF PHQ2 SCORE: 0

## 2019-03-06 PROBLEM — K21.9 GASTROESOPHAGEAL REFLUX DISEASE: Status: ACTIVE | Noted: 2019-03-06

## 2019-03-06 NOTE — ASSESSMENT & PLAN NOTE
Chronic and stable, controlled with current medications including felodipine and lisinopril.  Blood pressure today is within normal limits at 124/86 and he denies symptoms of hypertension.  He is past due for labs, these have been ordered.  He does need refills, these have been called in for him.

## 2019-03-06 NOTE — ASSESSMENT & PLAN NOTE
The ASCVD Risk score (Faviojohnson KWONG Jr., et al., 2013) failed to calculate for the following reasons:    The 2013 ASCVD risk score is only valid for ages 40 to 79  Patient is on atorvastatin 20 mg daily.  He is past due for labs, these have been ordered.

## 2019-03-06 NOTE — ASSESSMENT & PLAN NOTE
Patient has history of GERD, stable and controlled with daily use of PPI.  He does need refills on omeprazole 20 mg, this is been called in for him.  He was advised to avoid aggravating foods and activities.

## 2019-03-06 NOTE — ASSESSMENT & PLAN NOTE
Patient has history of elevated fasting glucose with a mildly elevated hemoglobin A1c in prediabetes range.  He is due for labs, these have been ordered.

## 2019-03-06 NOTE — ASSESSMENT & PLAN NOTE
Patient continues to smoke on a daily basis, he understands the risks associated with ongoing tobacco use especially in the setting of his comorbidities.  Declines assistance with cessation at this time.

## 2019-03-06 NOTE — ASSESSMENT & PLAN NOTE
This is a chronic condition, status unknown as he is well past due for labs.  He has not had labs done for over a year.  We discussed the importance of avoiding nephrotoxic medications and excessive salt in the diet, keeping blood pressure under good control and staying adequately hydrated.

## 2019-03-06 NOTE — PROGRESS NOTES
Subjective:     Elias Alberts is a 85 y.o. male here today for med refill and Annual Health Assessment.    HTN (hypertension)  Chronic and stable, controlled with current medications including felodipine and lisinopril.  Blood pressure today is within normal limits at 124/86 and he denies symptoms of hypertension.  He is past due for labs, these have been ordered.  He does need refills, these have been called in for him.    Peripheral arterial disease (CMS-[facility])  This is a chronic condition, takes Pletal every day.  Past ultrasound of lower extremity shows moderate peripheral arterial disease in the right leg.  He does have improvement in symptoms with the Pletal, he was seen by vascular medicine in the past but declines referral to be seen again in follow-up.  He does understand the risks associated with this condition as well as discontinued daily tobacco use.  He is appropriately on a statin.    Familial hypercholesterolemia  The ASCVD Risk score (Favio KWONG Jr., et al., 2013) failed to calculate for the following reasons:    The 2013 ASCVD risk score is only valid for ages 40 to 79  Patient is on atorvastatin 20 mg daily.  He is past due for labs, these have been ordered.    Renal insufficiency  This is a chronic condition, status unknown as he is well past due for labs.  He has not had labs done for over a year.  We discussed the importance of avoiding nephrotoxic medications and excessive salt in the diet, keeping blood pressure under good control and staying adequately hydrated.    Elevated fasting glucose  Patient has history of elevated fasting glucose with a mildly elevated hemoglobin A1c in prediabetes range.  He is due for labs, these have been ordered.    Gastroesophageal reflux disease  Patient has history of GERD, stable and controlled with daily use of PPI.  He does need refills on omeprazole 20 mg, this is been called in for him.  He was advised to avoid aggravating foods and activities.    Tobacco  abuse  Patient continues to smoke on a daily basis, he understands the risks associated with ongoing tobacco use especially in the setting of his comorbidities.  Declines assistance with cessation at this time.       Health Maintenance Summary                Annual Wellness Visit Overdue 11/19/1933     IMM DTaP/Tdap/Td Vaccine Overdue 11/19/1952     IMM ZOSTER VACCINES Overdue 11/19/1983     IMM PNEUMOCOCCAL 65+ (ADULT) LOW/MEDIUM RISK SERIES Overdue 11/19/1998     IMM INFLUENZA Overdue 9/1/2018            Annual Health Assessment Questions:     1.  Are you currently engaging in any exercise or physical activity? Yes    2.  How would you describe your mood or emotional well-being today? good    3.  Have you had any falls in the last year? No    4.  Have you noticed any problems with your balance or had difficulty walking? No    5.  In the last six months have you experienced any leakage of urine? No    6. DPA/Advanced Directive: Patient has Living Will, but it is not on file. Instructed to bring in a copy to scan into their chart.    Current medicines (including changes today)  Current Outpatient Prescriptions   Medication Sig Dispense Refill   • felodipine (PLENDIL) 10 MG TABLET SR 24 HR Take 1 Tab by mouth every day. 90 Tab 3   • lisinopril (PRINIVIL) 20 MG Tab TAKE 1 TABLET BY MOUTH DAILY 90 Tab 3   • cilostazol (PLETAL) 100 MG Tab TAKE 1 TABLET BY MOUTH TWICE DAILY 180 Tab 3   • atorvastatin (LIPITOR) 20 MG Tab Take 1 Tab by mouth every day. 90 Tab 3   • omeprazole (PRILOSEC) 20 MG delayed-release capsule Take 1 Cap by mouth every day. 90 Cap 1     No current facility-administered medications for this visit.        He  has a past medical history of GERD (gastroesophageal reflux disease); HTN (hypertension); Hypertension; and Tobacco abuse.    Patient has no known allergies.    He  reports that he has been smoking Cigarettes.  He has been smoking about 1.00 pack per day. He has never used smokeless tobacco. He  "reports that he drinks alcohol. He reports that he does not use drugs.  Ready to quit: No  Counseling given: Yes      ROS   ROS:  No fever, chills, sweats.  No polydipsia, polyuria, temperature intolerance, significant weight changes  No visual changes, blurred vision.  No chest pain, palpitations, peripheral swelling  No chronic cough, shortness of breath, dyspnea with exertion.  No dysphagia, odynophagia, black or bloody stools.  No abdominal pain, nausea, persistent diarrhea, constipation.   No dysuria, nocturia, hematuria, incontinence.  No rash, pruritis, pigment changes.  No persistent swollen glands, unusual bruising, lymphedema.  No focal weakness, syncope.  No joint swelling, muscle weakness or spasm  No chronic insomnia, depression, anxiety or other mood disorder.     Objective:     Physical Exam:  Blood pressure 124/86, pulse 72, temperature 36.9 °C (98.4 °F), temperature source Temporal, resp. rate 16, height 1.778 m (5' 10\"), weight 61.7 kg (136 lb), SpO2 94 %. Body mass index is 19.51 kg/m².   Constitutional: Pleasant elderly male, alert, no distress, well-nourished, well-groomed.  Skin: Warm, dry, good turgor, no rashes in visible areas.  Eye: Equal, round and reactive, conjunctiva clear, lids normal.  Respiratory: Unlabored respiratory effort, lungs clear to auscultation, no wheezes, no rhonchi.  Cardiovascular: Normal S1, S2, no murmur, no edema.  Abdomen: Soft, non-tender, no masses, no hepatosplenomegaly.  Psych: Alert and oriented x3, normal affect and mood.    Assessment and Plan:     There are no diagnoses linked to this encounter.    Elias was seen today for hyperlipidemia.    Diagnoses and all orders for this visit:    Peripheral arterial disease (HCC)  -     cilostazol (PLETAL) 100 MG Tab; TAKE 1 TABLET BY MOUTH TWICE DAILY    Essential hypertension  -     felodipine (PLENDIL) 10 MG TABLET SR 24 HR; Take 1 Tab by mouth every day.  -     lisinopril (PRINIVIL) 20 MG Tab; TAKE 1 TABLET BY " MOUTH DAILY    Claudication (HCC)  -     cilostazol (PLETAL) 100 MG Tab; TAKE 1 TABLET BY MOUTH TWICE DAILY    Elevated fasting glucose  -     HEMOGLOBIN A1C; Future    Renal insufficiency  -     Comp Metabolic Panel; Future    Familial hypercholesterolemia  -     Comp Metabolic Panel; Future  -     Lipid Profile; Future  -     atorvastatin (LIPITOR) 20 MG Tab; Take 1 Tab by mouth every day.    Encounter for screening for hematologic disorder  -     CBC WITH DIFFERENTIAL; Future    Gastroesophageal reflux disease, esophagitis presence not specified  -     omeprazole (PRILOSEC) 20 MG delayed-release capsule; Take 1 Cap by mouth every day.    Tobacco abuse      Discussion/medical decision making: Patient is going to have routine fasting labs done before he follows up with me in 6-8 weeks.  He was encouraged to stop smoking, he continues to decline assistance with this.  He was also encouraged to follow-up with vascular medicine, again he declines referral.  All of his medications have been refilled, he is to take them as prescribed.    Discussion today about general wellness and lifestyle habits:    · Engage in regular physical activity and social activities.  · Prevent falls and reduce trip hazards; using ambulatory aides, hearing and vision testing if appropriate.  · Steps to improve urinary incontinence.  · Advanced care planning.    Follow-Up: Return in about 6 weeks (around 4/16/2019) for Follow-up, Discuss Labs.         PLEASE NOTE: This dictation was created using voice recognition software. I have made every reasonable attempt to correct obvious errors, but I expect that there are errors of grammar and possibly content that I did not discover before finalizing the note.

## 2019-03-06 NOTE — ASSESSMENT & PLAN NOTE
This is a chronic condition, takes Pletal every day.  Past ultrasound of lower extremity shows moderate peripheral arterial disease in the right leg.  He does have improvement in symptoms with the Pletal, he was seen by vascular medicine in the past but declines referral to be seen again in follow-up.  He does understand the risks associated with this condition as well as discontinued daily tobacco use.  He is appropriately on a statin.

## 2019-04-25 ENCOUNTER — HOSPITAL ENCOUNTER (OUTPATIENT)
Dept: LAB | Facility: MEDICAL CENTER | Age: 84
End: 2019-04-25
Attending: NURSE PRACTITIONER
Payer: MEDICARE

## 2019-04-25 DIAGNOSIS — N28.9 RENAL INSUFFICIENCY: ICD-10-CM

## 2019-04-25 DIAGNOSIS — R73.01 ELEVATED FASTING GLUCOSE: ICD-10-CM

## 2019-04-25 DIAGNOSIS — Z13.0 ENCOUNTER FOR SCREENING FOR HEMATOLOGIC DISORDER: ICD-10-CM

## 2019-04-25 DIAGNOSIS — E78.01 FAMILIAL HYPERCHOLESTEROLEMIA: ICD-10-CM

## 2019-04-25 LAB
ALBUMIN SERPL BCP-MCNC: 4.2 G/DL (ref 3.2–4.9)
ALBUMIN/GLOB SERPL: 1.6 G/DL
ALP SERPL-CCNC: 82 U/L (ref 30–99)
ALT SERPL-CCNC: 17 U/L (ref 2–50)
ANION GAP SERPL CALC-SCNC: 9 MMOL/L (ref 0–11.9)
AST SERPL-CCNC: 21 U/L (ref 12–45)
BASOPHILS # BLD AUTO: 0.3 % (ref 0–1.8)
BASOPHILS # BLD: 0.03 K/UL (ref 0–0.12)
BILIRUB SERPL-MCNC: 0.7 MG/DL (ref 0.1–1.5)
BUN SERPL-MCNC: 13 MG/DL (ref 8–22)
CALCIUM SERPL-MCNC: 9 MG/DL (ref 8.5–10.5)
CHLORIDE SERPL-SCNC: 101 MMOL/L (ref 96–112)
CHOLEST SERPL-MCNC: 103 MG/DL (ref 100–199)
CO2 SERPL-SCNC: 21 MMOL/L (ref 20–33)
CREAT SERPL-MCNC: 0.95 MG/DL (ref 0.5–1.4)
EOSINOPHIL # BLD AUTO: 0.09 K/UL (ref 0–0.51)
EOSINOPHIL NFR BLD: 0.9 % (ref 0–6.9)
ERYTHROCYTE [DISTWIDTH] IN BLOOD BY AUTOMATED COUNT: 44.5 FL (ref 35.9–50)
EST. AVERAGE GLUCOSE BLD GHB EST-MCNC: 126 MG/DL
FASTING STATUS PATIENT QL REPORTED: NORMAL
GLOBULIN SER CALC-MCNC: 2.6 G/DL (ref 1.9–3.5)
GLUCOSE SERPL-MCNC: 130 MG/DL (ref 65–99)
HBA1C MFR BLD: 6 % (ref 0–5.6)
HCT VFR BLD AUTO: 36.4 % (ref 42–52)
HDLC SERPL-MCNC: 40 MG/DL
HGB BLD-MCNC: 12.5 G/DL (ref 14–18)
IMM GRANULOCYTES # BLD AUTO: 0.03 K/UL (ref 0–0.11)
IMM GRANULOCYTES NFR BLD AUTO: 0.3 % (ref 0–0.9)
LDLC SERPL CALC-MCNC: 50 MG/DL
LYMPHOCYTES # BLD AUTO: 3.26 K/UL (ref 1–4.8)
LYMPHOCYTES NFR BLD: 31.8 % (ref 22–41)
MCH RBC QN AUTO: 32 PG (ref 27–33)
MCHC RBC AUTO-ENTMCNC: 34.3 G/DL (ref 33.7–35.3)
MCV RBC AUTO: 93.1 FL (ref 81.4–97.8)
MONOCYTES # BLD AUTO: 0.92 K/UL (ref 0–0.85)
MONOCYTES NFR BLD AUTO: 9 % (ref 0–13.4)
NEUTROPHILS # BLD AUTO: 5.93 K/UL (ref 1.82–7.42)
NEUTROPHILS NFR BLD: 57.7 % (ref 44–72)
NRBC # BLD AUTO: 0 K/UL
NRBC BLD-RTO: 0 /100 WBC
PLATELET # BLD AUTO: 425 K/UL (ref 164–446)
PMV BLD AUTO: 9 FL (ref 9–12.9)
POTASSIUM SERPL-SCNC: 4.4 MMOL/L (ref 3.6–5.5)
PROT SERPL-MCNC: 6.8 G/DL (ref 6–8.2)
RBC # BLD AUTO: 3.91 M/UL (ref 4.7–6.1)
SODIUM SERPL-SCNC: 131 MMOL/L (ref 135–145)
TRIGL SERPL-MCNC: 66 MG/DL (ref 0–149)
WBC # BLD AUTO: 10.3 K/UL (ref 4.8–10.8)

## 2019-04-25 PROCEDURE — 80061 LIPID PANEL: CPT

## 2019-04-25 PROCEDURE — 36415 COLL VENOUS BLD VENIPUNCTURE: CPT

## 2019-04-25 PROCEDURE — 85025 COMPLETE CBC W/AUTO DIFF WBC: CPT

## 2019-04-25 PROCEDURE — 80053 COMPREHEN METABOLIC PANEL: CPT

## 2019-04-25 PROCEDURE — 83036 HEMOGLOBIN GLYCOSYLATED A1C: CPT

## 2019-04-30 ENCOUNTER — OFFICE VISIT (OUTPATIENT)
Dept: MEDICAL GROUP | Facility: PHYSICIAN GROUP | Age: 84
End: 2019-04-30
Payer: MEDICARE

## 2019-04-30 VITALS
RESPIRATION RATE: 16 BRPM | DIASTOLIC BLOOD PRESSURE: 72 MMHG | TEMPERATURE: 97.4 F | OXYGEN SATURATION: 96 % | WEIGHT: 131 LBS | HEART RATE: 76 BPM | HEIGHT: 70 IN | BODY MASS INDEX: 18.75 KG/M2 | SYSTOLIC BLOOD PRESSURE: 124 MMHG

## 2019-04-30 DIAGNOSIS — I10 ESSENTIAL HYPERTENSION: ICD-10-CM

## 2019-04-30 DIAGNOSIS — N28.9 RENAL INSUFFICIENCY: ICD-10-CM

## 2019-04-30 DIAGNOSIS — R79.89 ABNORMAL CBC: ICD-10-CM

## 2019-04-30 DIAGNOSIS — Z72.0 TOBACCO ABUSE: ICD-10-CM

## 2019-04-30 DIAGNOSIS — E78.01 FAMILIAL HYPERCHOLESTEROLEMIA: ICD-10-CM

## 2019-04-30 DIAGNOSIS — I73.9 PERIPHERAL ARTERIAL DISEASE (HCC): ICD-10-CM

## 2019-04-30 DIAGNOSIS — R73.01 ELEVATED FASTING GLUCOSE: ICD-10-CM

## 2019-04-30 PROCEDURE — 99214 OFFICE O/P EST MOD 30 MIN: CPT | Performed by: NURSE PRACTITIONER

## 2019-04-30 NOTE — PROGRESS NOTES
Chief Complaint   Patient presents with   • Follow-Up     labs          This is a 85 y.o.male patient that presents today with the following: Follow-up, review labs    Tobacco abuse  Patient continues to smoke on a daily basis, he understands the risks associated with this and is not ready to quit.  He declines assistance with cessation at this time.    HTN (hypertension)  Chronic and stable, well controlled on medication including lisinopril and felodipine.  Blood pressure today is well within normal limits and he denies symptoms of hypertension.    Elevated fasting glucose  Patient has history of elevated fasting glucose with a mildly elevated A1c in prediabetes range.  He is going to work on diet.    Renal insufficiency  Patient has history of chronic kidney disease, this has been stable.  We discussed again the importance of avoiding nephrotoxic medications, excessive salt in the diet, keeping blood pressure under good control and staying adequately hydrated.    Familial hypercholesterolemia  The ASCVD Risk score (Favio KWONG Jr., et al., 2013) failed to calculate for the following reasons:    The 2013 ASCVD risk score is only valid for ages 40 to 79  Patient is currently on atorvastatin 20 mg daily.  Recent labs show the following:  Component      Latest Ref Rng & Units 4/25/2019           6:39 AM   Cholesterol,Tot      100 - 199 mg/dL 103   Triglycerides      0 - 149 mg/dL 66   HDL      >=40 mg/dL 40   LDL      <100 mg/dL 50       Peripheral arterial disease (CMS-HCC)  This is a chronic condition for which she takes Pletal every day.  Past ultrasounds of the lower extremity showed moderate peripheral artery disease in the right leg which she has chronic pain.  He does have some improvement with the Pletal.  He has been seen by vascular medicine in the past but continues to decline referral for follow-up.  He does understand the risks associated with this as well as the importance of tobacco cessation.  He is  appropriately on statin at this time.    Abnormal CBC  Patient was found to have abnormal CBC, mildly decreased hemoglobin and hematocrit but MCV is normal.  He is going to repeat CBC in 1 month, he will be notified of results and further actions if needed.      Hospital Outpatient Visit on 04/25/2019   Component Date Value   • Sodium 04/25/2019 131*   • Potassium 04/25/2019 4.4    • Chloride 04/25/2019 101    • Co2 04/25/2019 21    • Anion Gap 04/25/2019 9.0    • Glucose 04/25/2019 130*   • Bun 04/25/2019 13    • Creatinine 04/25/2019 0.95    • Calcium 04/25/2019 9.0    • AST(SGOT) 04/25/2019 21    • ALT(SGPT) 04/25/2019 17    • Alkaline Phosphatase 04/25/2019 82    • Total Bilirubin 04/25/2019 0.7    • Albumin 04/25/2019 4.2    • Total Protein 04/25/2019 6.8    • Globulin 04/25/2019 2.6    • A-G Ratio 04/25/2019 1.6    • WBC 04/25/2019 10.3    • RBC 04/25/2019 3.91*   • Hemoglobin 04/25/2019 12.5*   • Hematocrit 04/25/2019 36.4*   • MCV 04/25/2019 93.1    • MCH 04/25/2019 32.0    • MCHC 04/25/2019 34.3    • RDW 04/25/2019 44.5    • Platelet Count 04/25/2019 425    • MPV 04/25/2019 9.0    • Neutrophils-Polys 04/25/2019 57.70    • Lymphocytes 04/25/2019 31.80    • Monocytes 04/25/2019 9.00    • Eosinophils 04/25/2019 0.90    • Basophils 04/25/2019 0.30    • Immature Granulocytes 04/25/2019 0.30    • Nucleated RBC 04/25/2019 0.00    • Neutrophils (Absolute) 04/25/2019 5.93    • Lymphs (Absolute) 04/25/2019 3.26    • Monos (Absolute) 04/25/2019 0.92*   • Eos (Absolute) 04/25/2019 0.09    • Baso (Absolute) 04/25/2019 0.03    • Immature Granulocytes (a* 04/25/2019 0.03    • NRBC (Absolute) 04/25/2019 0.00    • Cholesterol,Tot 04/25/2019 103    • Triglycerides 04/25/2019 66    • HDL 04/25/2019 40    • LDL 04/25/2019 50    • Glycohemoglobin 04/25/2019 6.0*   • Est Avg Glucose 04/25/2019 126    • Fasting Status 04/25/2019 Fasting    • GFR If  04/25/2019 >60    • GFR If Non  Ameri* 04/25/2019 >60   "        clinical course has been stable    Past Medical History:   Diagnosis Date   • GERD (gastroesophageal reflux disease)    • HTN (hypertension)    • Hypertension    • Tobacco abuse        Past Surgical History:   Procedure Laterality Date   • CATARACT PHACO WITH IOL  5/15/2012    Performed by CORBY MIRELES at [facility]   • ENDOSCOPY SMALL INTESTINE         No family history on file.    Patient has no known allergies.    Current Outpatient Prescriptions Ordered in [facility]   Medication Sig Dispense Refill   • felodipine (PLENDIL) 10 MG TABLET SR 24 HR Take 1 Tab by mouth every day. 90 Tab 3   • lisinopril (PRINIVIL) 20 MG Tab TAKE 1 TABLET BY MOUTH DAILY 90 Tab 3   • cilostazol (PLETAL) 100 MG Tab TAKE 1 TABLET BY MOUTH TWICE DAILY 180 Tab 3   • atorvastatin (LIPITOR) 20 MG Tab Take 1 Tab by mouth every day. 90 Tab 3   • omeprazole (PRILOSEC) 20 MG delayed-release capsule Take 1 Cap by mouth every day. 90 Cap 1     No current Epic-ordered facility-administered medications on file.        Constitutional ROS: No unexpected change in weight, No weakness, No unexplained fevers, sweats, or chills  Pulmonary ROS: No chronic cough, sputum, or hemoptysis, No shortness of breath, No recent change in breathing, Positive for smoker   Cardiovascular ROS: No chest pain, No edema, No palpitations, Positive for hypertension, hyperlipidemia, peripheral artery disease  Gastrointestinal ROS: No abdominal pain, No nausea, vomiting, diarrhea, or constipation  Musculoskeletal/Extremities ROS: No clubbing, No peripheral edema, No pain, redness or swelling on the joints  Hematologic/Lymphatic ROS: Positive per HPI  Neurologic ROS: Normal development, No seizures, No weakness   ROS: Positive per HPI    Physical exam:  /72   Pulse 76   Temp 36.3 °C (97.4 °F) (Temporal)   Resp 16   Ht 1.778 m (5' 10\")   Wt 59.4 kg (131 lb)   SpO2 96%   BMI 18.80 kg/m²   General Appearance: Pleasant elderly male, alert, no " distress, underweight, well-groomed  Skin: Skin color, texture, turgor normal. No rashes or lesions.  Lungs: negative findings: normal respiratory rate and rhythm, lungs clear to auscultation  Heart: negative. RRR without murmur, gallop, or rubs.  No ectopy.  Abdomen: Abdomen soft, non-tender. BS normal. No masses,  No organomegaly  Musculoskeletal: negative findings: no evidence of joint instability, no evidence of muscle atrophy, no deformities present  Neurologic: intact    Medical decision making/discussion: Patient was again advised to follow-up with vascular medicine, he continues to decline referral.  He is going to follow-up with me in 5 months but have repeat CBC in 1 month.  We discussed the importance of avoiding nephrotoxic medications as well as excessive salt in the diet, keeping blood pressure under good control and staying adequately hydrated.    Elias was seen today for follow-up.    Diagnoses and all orders for this visit:    Essential hypertension    Renal insufficiency    Familial hypercholesterolemia    Elevated fasting glucose    Peripheral arterial disease (HCC)    Abnormal CBC  -     CBC WITH DIFFERENTIAL; Future  -     FERRITIN; Future  -     IRON/TOTAL IRON BIND; Future    Tobacco abuse        Return in about 5 months (around 9/30/2019) for Discuss Labs, Follow-up.        Please note that this dictation was created using voice recognition software. I have made every reasonable attempt to correct obvious errors, but I expect that there are errors of grammar and possibly content that I did not discover before finalizing the note.

## 2019-05-01 NOTE — ASSESSMENT & PLAN NOTE
Patient was found to have abnormal CBC, mildly decreased hemoglobin and hematocrit but MCV is normal.  He is going to repeat CBC in 1 month, he will be notified of results and further actions if needed.

## 2019-05-01 NOTE — ASSESSMENT & PLAN NOTE
Patient has history of chronic kidney disease, this has been stable.  We discussed again the importance of avoiding nephrotoxic medications, excessive salt in the diet, keeping blood pressure under good control and staying adequately hydrated.

## 2019-05-01 NOTE — ASSESSMENT & PLAN NOTE
Patient continues to smoke on a daily basis, he understands the risks associated with this and is not ready to quit.  He declines assistance with cessation at this time.

## 2019-05-01 NOTE — ASSESSMENT & PLAN NOTE
This is a chronic condition for which she takes Pletal every day.  Past ultrasounds of the lower extremity showed moderate peripheral artery disease in the right leg which she has chronic pain.  He does have some improvement with the Pletal.  He has been seen by vascular medicine in the past but continues to decline referral for follow-up.  He does understand the risks associated with this as well as the importance of tobacco cessation.  He is appropriately on statin at this time.

## 2019-05-01 NOTE — ASSESSMENT & PLAN NOTE
Chronic and stable, well controlled on medication including lisinopril and felodipine.  Blood pressure today is well within normal limits and he denies symptoms of hypertension.

## 2019-05-01 NOTE — ASSESSMENT & PLAN NOTE
Patient has history of elevated fasting glucose with a mildly elevated A1c in prediabetes range.  He is going to work on diet.

## 2019-05-01 NOTE — ASSESSMENT & PLAN NOTE
The ASCVD Risk score (Faviojohnson KWONG Jr., et al., 2013) failed to calculate for the following reasons:    The 2013 ASCVD risk score is only valid for ages 40 to 79  Patient is currently on atorvastatin 20 mg daily.  Recent labs show the following:  Component      Latest Ref Rng & Units 4/25/2019           6:39 AM   Cholesterol,Tot      100 - 199 mg/dL 103   Triglycerides      0 - 149 mg/dL 66   HDL      >=40 mg/dL 40   LDL      <100 mg/dL 50

## 2019-06-04 ENCOUNTER — TELEPHONE (OUTPATIENT)
Dept: URGENT CARE | Facility: PHYSICIAN GROUP | Age: 84
End: 2019-06-04

## 2019-06-04 NOTE — TELEPHONE ENCOUNTER
Patients wife came in and requested a call from his provider to advise as to how much iron he should be taking daily after being told he was anemic. She is requesting a message/call if at all possible

## 2019-06-06 ENCOUNTER — TELEPHONE (OUTPATIENT)
Dept: MEDICAL GROUP | Facility: PHYSICIAN GROUP | Age: 84
End: 2019-06-06

## 2019-06-06 NOTE — TELEPHONE ENCOUNTER
1. Caller Name: Ofe-Pt Spouse                                       Call Back Number: [phone]      Patient approves a detailed voicemail message: no    Ofe was not listed on patient's chart. I spoke with patient and he said he doesn't know why Ofe is calling [facility]. I asked him if it was ok to speak with her and he said yes. Ofe states that at patient's last visit PCP said patient was anemic. Ofe would like to know what the next step is for patient. Ofe states that she wants answers today. Advised Ofe that PCP is out and very ill. PCP will get back to them at her earliest convenience. Patient was not happy.

## 2019-06-06 NOTE — TELEPHONE ENCOUNTER
At pt's last visit, I wanted to repeat the labs in 1 month, this includes iron studies. This was discussed with the pt. The labs were ordered at that appt. When he gets those labs done, will determine what the next steps are

## 2019-06-07 NOTE — TELEPHONE ENCOUNTER
Patient does not have to wait until next appointment, the message states that he needs to have repeat lab work and once I get those results I will know further what to do.  But if she insists on him starting a medication now, I will call in iron supplement

## 2019-06-07 NOTE — TELEPHONE ENCOUNTER
Phone Number Called: [phone] (home)       Call outcome: spoke to patient regarding message below    Message: Ofe advised and said why do they have to wait for the next appointment. Ofe states that this is urgent and patient should be seen sooner.

## 2019-06-07 NOTE — TELEPHONE ENCOUNTER
Disregard last message, I called and spoke with wife and re-iterated the need to have pt repeat labs, which were ordered at last visit, pt still has not had them done. tried to explain that there are several different types of anemia, thus the reason for repeat labs and iron studies. Asking if he can take OTC iron supplements, told her ok, but strongly encouraged her to have pt get labs done. Can we get him in sooner, wife seems to not what is being recommended

## 2019-06-10 ENCOUNTER — HOSPITAL ENCOUNTER (OUTPATIENT)
Dept: LAB | Facility: MEDICAL CENTER | Age: 84
End: 2019-06-10
Attending: NURSE PRACTITIONER
Payer: MEDICARE

## 2019-06-10 DIAGNOSIS — R79.89 ABNORMAL CBC: ICD-10-CM

## 2019-06-10 LAB
BASOPHILS # BLD AUTO: 0.3 % (ref 0–1.8)
BASOPHILS # BLD: 0.04 K/UL (ref 0–0.12)
EOSINOPHIL # BLD AUTO: 0.01 K/UL (ref 0–0.51)
EOSINOPHIL NFR BLD: 0.1 % (ref 0–6.9)
ERYTHROCYTE [DISTWIDTH] IN BLOOD BY AUTOMATED COUNT: 43 FL (ref 35.9–50)
FERRITIN SERPL-MCNC: 388.3 NG/ML (ref 22–322)
HCT VFR BLD AUTO: 36.4 % (ref 42–52)
HGB BLD-MCNC: 12.6 G/DL (ref 14–18)
IMM GRANULOCYTES # BLD AUTO: 0.03 K/UL (ref 0–0.11)
IMM GRANULOCYTES NFR BLD AUTO: 0.2 % (ref 0–0.9)
IRON SATN MFR SERPL: 12 % (ref 15–55)
IRON SERPL-MCNC: 27 UG/DL (ref 50–180)
LYMPHOCYTES # BLD AUTO: 1.65 K/UL (ref 1–4.8)
LYMPHOCYTES NFR BLD: 11.4 % (ref 22–41)
MCH RBC QN AUTO: 31.5 PG (ref 27–33)
MCHC RBC AUTO-ENTMCNC: 34.6 G/DL (ref 33.7–35.3)
MCV RBC AUTO: 91 FL (ref 81.4–97.8)
MONOCYTES # BLD AUTO: 1.05 K/UL (ref 0–0.85)
MONOCYTES NFR BLD AUTO: 7.3 % (ref 0–13.4)
NEUTROPHILS # BLD AUTO: 11.7 K/UL (ref 1.82–7.42)
NEUTROPHILS NFR BLD: 80.7 % (ref 44–72)
NRBC # BLD AUTO: 0 K/UL
NRBC BLD-RTO: 0 /100 WBC
PLATELET # BLD AUTO: 698 K/UL (ref 164–446)
PMV BLD AUTO: 8.6 FL (ref 9–12.9)
RBC # BLD AUTO: 4 M/UL (ref 4.7–6.1)
TIBC SERPL-MCNC: 220 UG/DL (ref 250–450)
WBC # BLD AUTO: 14.5 K/UL (ref 4.8–10.8)

## 2019-06-10 PROCEDURE — 82728 ASSAY OF FERRITIN: CPT

## 2019-06-10 PROCEDURE — 83540 ASSAY OF IRON: CPT

## 2019-06-10 PROCEDURE — 83550 IRON BINDING TEST: CPT

## 2019-06-10 PROCEDURE — 85025 COMPLETE CBC W/AUTO DIFF WBC: CPT

## 2019-06-10 PROCEDURE — 36415 COLL VENOUS BLD VENIPUNCTURE: CPT

## 2019-06-12 ENCOUNTER — OFFICE VISIT (OUTPATIENT)
Dept: URGENT CARE | Facility: PHYSICIAN GROUP | Age: 84
End: 2019-06-12
Payer: MEDICARE

## 2019-06-12 VITALS
OXYGEN SATURATION: 92 % | TEMPERATURE: 97.2 F | HEART RATE: 100 BPM | WEIGHT: 116 LBS | BODY MASS INDEX: 16.64 KG/M2 | SYSTOLIC BLOOD PRESSURE: 132 MMHG | RESPIRATION RATE: 20 BRPM | DIASTOLIC BLOOD PRESSURE: 68 MMHG

## 2019-06-12 DIAGNOSIS — D64.9 ANEMIA, UNSPECIFIED TYPE: ICD-10-CM

## 2019-06-12 DIAGNOSIS — R63.4 WEIGHT LOSS, UNINTENTIONAL: ICD-10-CM

## 2019-06-12 DIAGNOSIS — R53.1 GENERALIZED WEAKNESS: ICD-10-CM

## 2019-06-12 DIAGNOSIS — R63.0 LOSS OF APPETITE FOR MORE THAN 2 WEEKS: ICD-10-CM

## 2019-06-12 DIAGNOSIS — Z72.0 TOBACCO ABUSE: ICD-10-CM

## 2019-06-12 DIAGNOSIS — R05.9 COUGH: ICD-10-CM

## 2019-06-12 PROCEDURE — 99214 OFFICE O/P EST MOD 30 MIN: CPT | Performed by: NURSE PRACTITIONER

## 2019-06-12 ASSESSMENT — ENCOUNTER SYMPTOMS
FEVER: 0
SENSORY CHANGE: 0
CHILLS: 0
COUGH: 1
DOUBLE VISION: 0
PALPITATIONS: 0
SPUTUM PRODUCTION: 0
HEMOPTYSIS: 0
SORE THROAT: 0
SINUS PAIN: 0
VOMITING: 0
NAUSEA: 0
HEADACHES: 0
ABDOMINAL PAIN: 0
SHORTNESS OF BREATH: 0
DIARRHEA: 0
DIZZINESS: 0
BLURRED VISION: 0
EYE REDNESS: 0

## 2019-06-12 ASSESSMENT — LIFESTYLE VARIABLES: SUBSTANCE_ABUSE: 0

## 2019-06-12 NOTE — PROGRESS NOTES
"Subjective:      Elias Alberts is a 85 y.o. male who presents with Weight Loss (x 3 months ) and Leg Pain (right x 2 years )    Reviewed past medical, surgical and family history with patient. Reviewed prescription and OTC medications with patient in electronic health record today.     No Known Allergies          HPI this is new problem.  Abad is a 85-year-old male patient presents with weight loss for 3 months.  He is also experiencing leg pain for 2 years.  His weight loss is unintentional.  He eats a bowl of soup a day per his wife.  She has to strongly encourage him to eat.  Patient is denying any discomfort.  \"I feel great for an 85-year-old\".  His wife reports that he has developed a cough over the past few weeks that is stronger than his normal \"smoker's cough\".  She has been treating him with over-the-counter cough medications which have helped.  His cough has been nonproductive.  Denies nausea, vomiting, diarrhea, constipation, abdominal pain, chest pain, shortness of breath. Had labs drawn earlier this week for 'anemia\". No other aggravating or alleviating factors.       Review of Systems   Constitutional: Negative for chills, fever and malaise/fatigue.   HENT: Negative for congestion, ear pain, sinus pain and sore throat.    Eyes: Negative for blurred vision, double vision and redness.   Respiratory: Positive for cough. Negative for hemoptysis, sputum production and shortness of breath.    Cardiovascular: Negative for chest pain and palpitations.   Gastrointestinal: Negative for abdominal pain, diarrhea, nausea and vomiting.   Genitourinary: Negative for dysuria, frequency, hematuria and urgency.   Skin: Negative for rash.   Neurological: Negative for dizziness, sensory change and headaches.   Endo/Heme/Allergies: Negative for environmental allergies.   Psychiatric/Behavioral: Negative for substance abuse.          Objective:     /68   Pulse 100   Temp 36.2 °C (97.2 °F) (Temporal)   Resp 20   " Wt 52.6 kg (116 lb)   SpO2 92%   BMI 16.64 kg/m²      Physical Exam   Constitutional: He is oriented to person, place, and time. Vital signs are normal. He appears well-developed and well-nourished. He is cooperative. He has a sickly appearance.   HENT:   Head: Normocephalic.   Right Ear: Hearing, tympanic membrane, external ear and ear canal normal.   Left Ear: Hearing, tympanic membrane, external ear and ear canal normal.   Nose: Nose normal. No sinus tenderness.   Mouth/Throat: Uvula is midline, oropharynx is clear and moist and mucous membranes are normal.   Eyes: Pupils are equal, round, and reactive to light.   Neck: Normal range of motion.   Cardiovascular: Normal rate and regular rhythm.    Pulmonary/Chest: Effort normal. No tachypnea. He has rhonchi in the right upper field and the right middle field.   Abdominal: Normal appearance and bowel sounds are normal. There is no tenderness. There is no rigidity, no rebound, no guarding and no CVA tenderness.   Lymphadenopathy:        Head (right side): No submental, no submandibular and no tonsillar adenopathy present.        Head (left side): No submental, no submandibular and no tonsillar adenopathy present.     He has no axillary adenopathy.        Right: No supraclavicular adenopathy present.        Left: No supraclavicular adenopathy present.   Neurological: He is alert and oriented to person, place, and time.   Skin: Skin is warm. Capillary refill takes less than 2 seconds.   Psychiatric: He has a normal mood and affect. His behavior is normal. Thought content normal.   Nursing note and vitals reviewed.    Pertinent previous visits, Labs & Imaging studies reviewed. (See  Epic chart)     Weight 131 4/30/19      06/10/19  Labs   Component Value Ref Range & Units Status   WBC 14.5   4.8 - 10.8 K/uL Final   RBC 4.00   4.70 - 6.10 M/uL Final   Hemoglobin 12.6   14.0 - 18.0 g/dL Final   Hematocrit 36.4   42.0 - 52.0 % Final   MCV 91.0  81.4 - 97.8 fL Final   MCH  31.5  27.0 - 33.0 pg Final   MCHC 34.6  33.7 - 35.3 g/dL Final   RDW 43.0  35.9 - 50.0 fL Final   Platelet Count 698   164 - 446 K/uL Final   MPV 8.6   9.0 - 12.9 fL Final   Neutrophils-Polys 80.70   44.00 - 72.00 % Final   Lymphocytes 11.40   22.00 - 41.00 % Final   Monocytes 7.30  0.00 - 13.40 % Final   Eosinophils 0.10  0.00 - 6.90 % Final   Basophils 0.30  0.00 - 1.80 % Final   Immature Granulocytes 0.20  0.00 - 0.90 % Final   Nucleated RBC 0.00  /100 WBC Final   Neutrophils (Absolute) 11.70   1.82 - 7.42 K/uL Final   Comment:   Includes immature neutrophils, if present.   Lymphs (Absolute) 1.65  1.00 - 4.80 K/uL Final   Monos (Absolute) 1.05   0.00 - 0.85 K/uL Final   Eos (Absolute) 0.01  0.00 - 0.51 K/uL Final   Baso (Absolute) 0.04  0.00 - 0.12 K/uL Final   Immature Granulocytes (abs) 0.03  0.00 - 0.11 K/uL Final   NRBC (Absolute) 0.00  K/uL      Consulted with RENÉ Good Who is pt's PCP.      Assessment/Plan:     1. Weight loss, unintentional     2. Generalized weakness     3. Cough     4. Tobacco abuse     5. Loss of appetite for more than 2 weeks     6. Anemia, unspecified type           [facility] Emergency Room called to arrange transfer to higher level of care. Report given to ER physician.  Pt is to be transported via POV with his wife.   I have reiterated to patient that although a provider to provider transfer was made this will not necessarily expedite the ER process

## 2021-01-11 DIAGNOSIS — Z23 NEED FOR VACCINATION: ICD-10-CM
